# Patient Record
Sex: FEMALE | Race: WHITE | NOT HISPANIC OR LATINO | Employment: PART TIME | URBAN - NONMETROPOLITAN AREA
[De-identification: names, ages, dates, MRNs, and addresses within clinical notes are randomized per-mention and may not be internally consistent; named-entity substitution may affect disease eponyms.]

---

## 2017-01-18 ENCOUNTER — PRENATAL OFFICE VISIT (OUTPATIENT)
Dept: FAMILY MEDICINE | Facility: OTHER | Age: 37
End: 2017-01-18
Payer: COMMERCIAL

## 2017-01-18 VITALS
HEART RATE: 103 BPM | SYSTOLIC BLOOD PRESSURE: 104 MMHG | HEIGHT: 67 IN | WEIGHT: 140.1 LBS | BODY MASS INDEX: 21.99 KG/M2 | OXYGEN SATURATION: 100 % | TEMPERATURE: 98.6 F | DIASTOLIC BLOOD PRESSURE: 58 MMHG | RESPIRATION RATE: 14 BRPM

## 2017-01-18 DIAGNOSIS — Z34.82 ENCOUNTER FOR SUPERVISION OF OTHER NORMAL PREGNANCY IN SECOND TRIMESTER: Primary | ICD-10-CM

## 2017-01-18 PROCEDURE — 59425 ANTEPARTUM CARE ONLY: CPT | Performed by: FAMILY MEDICINE

## 2017-01-18 ASSESSMENT — PAIN SCALES - GENERAL: PAINLEVEL: NO PAIN (0)

## 2017-01-18 NOTE — NURSING NOTE
"Chief Complaint   Patient presents with     Prenatal Care     22w2d       Initial /58 mmHg  Pulse 103  Temp(Src) 98.6  F (37  C) (Tympanic)  Resp 14  Ht 5' 6.5\" (1.689 m)  Wt 140 lb 1.6 oz (63.549 kg)  BMI 22.28 kg/m2  SpO2 100%  LMP 08/05/2016 (Approximate) Estimated body mass index is 22.28 kg/(m^2) as calculated from the following:    Height as of this encounter: 5' 6.5\" (1.689 m).    Weight as of this encounter: 140 lb 1.6 oz (63.549 kg).  BP completed using cuff size: regular  Health Maintenance Due   Topic Date Due     TETANUS IMMUNIZATION (SYSTEM ASSIGNED)  07/06/2000     INFLUENZA VACCINE (SYSTEM ASSIGNED)  09/01/2016     MATERNAL SCREENING  11/28/2016     OBGCT (OB)  01/30/2017     Rachel Dejesus, [facility]      "

## 2017-01-18 NOTE — PROGRESS NOTES
22w2d  Combined Weight Gain:  17  Fetal Heart Rate:  140  Fundal Height:  22  Position:    Urine Lab Results:    Risk Factors Assessed:  YES. Date-  Patient Active Problem List   Diagnosis     Status post  delivery     Breast feeding status of mother     Rh negative status during pregnancy in first trimester     Encounter for supervision of other normal pregnancy in second trimester     Doing well.  No concerns today.  Prenatal flowsheet information is reviewed.  Discussed triple/quad screening.  She declines testing at this time.  Discussed kick counts and fetal movement.  Reportable signs and symptoms discussed..  Vaginal Discharge:  normal  Nausea and Vomiting: No   Cervical Dilation:  Deferred  Effacement:  Deferred  Edema:  None  Fetal Activity: Active and Discussed fetal kick counts   Labor Signs/Symptoms: None  Vitamins:  PNV  Next Appointment:  4 weeks

## 2017-01-19 ENCOUNTER — HOSPITAL ENCOUNTER (OUTPATIENT)
Dept: ULTRASOUND IMAGING | Facility: CLINIC | Age: 37
Discharge: HOME | End: 2017-01-19
Attending: FAMILY MEDICINE | Admitting: FAMILY MEDICINE
Payer: COMMERCIAL

## 2017-01-19 DIAGNOSIS — Z34.81 ENCOUNTER FOR SUPERVISION OF OTHER NORMAL PREGNANCY IN FIRST TRIMESTER: ICD-10-CM

## 2017-01-19 PROCEDURE — 76805 OB US >/= 14 WKS SNGL FETUS: CPT

## 2017-01-20 PROBLEM — O44.42 LOW-LYING PLACENTA IN SECOND TRIMESTER: Status: ACTIVE | Noted: 2017-01-20

## 2017-02-16 ENCOUNTER — PRENATAL OFFICE VISIT (OUTPATIENT)
Dept: FAMILY MEDICINE | Facility: OTHER | Age: 37
End: 2017-02-16
Payer: COMMERCIAL

## 2017-02-16 VITALS
TEMPERATURE: 97.4 F | WEIGHT: 141.5 LBS | SYSTOLIC BLOOD PRESSURE: 100 MMHG | DIASTOLIC BLOOD PRESSURE: 62 MMHG | HEART RATE: 100 BPM | BODY MASS INDEX: 22.5 KG/M2 | OXYGEN SATURATION: 100 % | RESPIRATION RATE: 12 BRPM

## 2017-02-16 DIAGNOSIS — Z34.02 ENCOUNTER FOR SUPERVISION OF NORMAL FIRST PREGNANCY IN SECOND TRIMESTER: ICD-10-CM

## 2017-02-16 DIAGNOSIS — Z34.82 ENCOUNTER FOR SUPERVISION OF OTHER NORMAL PREGNANCY IN SECOND TRIMESTER: Primary | ICD-10-CM

## 2017-02-16 LAB
GLUCOSE 1H P 50 G GLC PO SERPL-MCNC: 89 MG/DL (ref 60–129)
HGB BLD-MCNC: 13.8 G/DL (ref 11.7–15.7)

## 2017-02-16 PROCEDURE — 00000218 ZZHCL STATISTIC OBHBG - HEMOGLOBIN: Performed by: FAMILY MEDICINE

## 2017-02-16 PROCEDURE — 99213 OFFICE O/P EST LOW 20 MIN: CPT | Performed by: FAMILY MEDICINE

## 2017-02-16 PROCEDURE — 82950 GLUCOSE TEST: CPT | Performed by: FAMILY MEDICINE

## 2017-02-16 PROCEDURE — 36415 COLL VENOUS BLD VENIPUNCTURE: CPT | Performed by: FAMILY MEDICINE

## 2017-02-16 ASSESSMENT — PAIN SCALES - GENERAL: PAINLEVEL: NO PAIN (0)

## 2017-02-16 NOTE — MR AVS SNAPSHOT
After Visit Summary   2/16/2017    Kirti Berry    MRN: [id number]           Patient Information     Date Of Birth          1980        Visit Information        Provider Department      2/16/2017 8:40 AM Mickey Gann MD [facility]        Today's Diagnoses     Encounter for supervision of other normal pregnancy in second trimester    -  1    Encounter for supervision of normal first pregnancy in second trimester           Follow-ups after your visit        Who to contact     If you have questions or need follow up information about today's clinic visit or your schedule please contact [facility] directly at [phone].  Normal or non-critical lab and imaging results will be communicated to you by SoFihart, letter or phone within 4 business days after the clinic has received the results. If you do not hear from us within 7 days, please contact the clinic through Markafonit or phone. If you have a critical or abnormal lab result, we will notify you by phone as soon as possible.  Submit refill requests through TSSI Systems or call your pharmacy and they will forward the refill request to us. Please allow 3 business days for your refill to be completed.          Additional Information About Your Visit        MyChart Information     TSSI Systems gives you secure access to your electronic health record. If you see a primary care provider, you can also send messages to your care team and make appointments. If you have questions, please call your primary care clinic.  If you do not have a primary care provider, please call [phone] and they will assist you.        Care EveryWhere ID     This is your Care EveryWhere ID. This could be used by other organizations to access your [place] medical records  [id number]        Your Vitals Were     Pulse Temperature Respirations Last Period Pulse Oximetry BMI (Body Mass Index)    100 97.4  F (36.3  C) (Tympanic) 12 08/05/2016 (Approximate)  100% 22.5 kg/m2       Blood Pressure from Last 3 Encounters:   02/16/17 100/62   01/18/17 104/58   12/22/16 94/52    Weight from Last 3 Encounters:   02/16/17 141 lb 8 oz (64.2 kg)   01/18/17 140 lb 1.6 oz (63.5 kg)   12/22/16 132 lb (59.9 kg)              We Performed the Following     Glucose tolerance, gest screen, 1 hour     OB Hemoglobin     RH IMMUNE GLOBULIN        Primary Care Provider    None Specified       No primary provider on file.        Thank you!     Thank you for choosing [facility]  for your care. Our goal is always to provide you with excellent care. Hearing back from our patients is one way we can continue to improve our services. Please take a few minutes to complete the written survey that you may receive in the mail after your visit with us. Thank you!             Your Updated Medication List - Protect others around you: Learn how to safely use, store and throw away your medicines at www.disposemymeds.org.          This list is accurate as of: 2/16/17 10:43 AM.  Always use your most recent med list.                   Brand Name Dispense Instructions for use    prenatal multivitamin  plus iron 27-0.8 MG Tabs per tablet     100 tablet    Take 1 tablet by mouth daily       rho(D) immune globulin 300 MCG injection    HYPERRHO/RHOGAM    1 mcg    Inject 300 mcg into the muscle once

## 2017-02-16 NOTE — NURSING NOTE
"Chief Complaint   Patient presents with     Prenatal Care       Initial /62 (BP Location: Left arm, Patient Position: Chair, Cuff Size: Adult Regular)  Pulse 100  Temp 97.4  F (36.3  C) (Tympanic)  Resp 12  Wt 141 lb 8 oz (64.2 kg)  LMP 08/05/2016 (Approximate)  SpO2 100%  BMI 22.5 kg/m2 Estimated body mass index is 22.5 kg/(m^2) as calculated from the following:    Height as of 1/18/17: 5' 6.5\" (1.689 m).    Weight as of this encounter: 141 lb 8 oz (64.2 kg).  Medication Reconciliation: complete   Julia Peterson CMA (AAMA)      "

## 2017-02-16 NOTE — PROGRESS NOTES
26w3d  Combined Weight Gain:  18  Fetal Heart Rate:  140  Fundal Height:  26  Position:  Vertex  Urine Lab Results:    Risk Factors Assessed:  YES. Date-    Patient Active Problem List   Diagnosis     Status post  delivery     Breast feeding status of mother     Rh negative status during pregnancy in first trimester     Encounter for supervision of other normal pregnancy in second trimester     Low-lying placenta in second trimester     Doing well.  No concerns today.    Vaginal Discharge:  normal  Nausea and Vomiting: No   Cervical Dilation:  Deferred  Effacement:  Deferred  Edema:  None  Fetal Activity: Active and Discussed fetal kick counts   Labor Signs/Symptoms: None  Vitamins:  PNV  Next Appointment:  4 weeks      Kirti is here for her 26 week visit.  She is 26w3d weeks today.  Kirti understands the following:  Definition of  labor:   Yes  Warning signs of  labor:  Yes  Palpatation of uterine contractions:  Yes  Warning signs of pregnancy complications:  Yes  Weight gain:  Yes  Nutrition choices (review intake and risks):  Yes  Prevention/treatment of constipation (increase fluids/fiber):  Yes  Discontinue smoking, drug use, ETOH use:  Yes  Decrease strenuous activities (increase rest):  Yes  Consider work/activity/environmental hazards:  Yes  Travel recommendations:  Yes  Sexual activity/intercourse:  Yes  Avoid nipple stimulation:  Yes  Infant feeding plans:  Yes  Car seat:  Yes  Fetal kick counts discussed:  Yes  Other topics discussed:      Tubal Sterilization signed?  Unknown   scheduled:  No   MD Choice:    Electronically signed by Mickey Gann MD

## 2017-02-16 NOTE — PROGRESS NOTES
Prior to injection verified patient identity using patient's name and date of birth.    The following medication was given:     MEDICATION: RhoGam 300 ug  ROUTE: IM  SITE: RUQ - Gluteus  DOSE: 300 ug  LOT #: [id number]  :  Ruben  EXPIRATION DATE:  2017  NDC#: 9005-4709-00    Julia Peterson CMA (AAMA)

## 2017-03-22 ENCOUNTER — PRENATAL OFFICE VISIT (OUTPATIENT)
Dept: FAMILY MEDICINE | Facility: OTHER | Age: 37
End: 2017-03-22
Payer: MEDICAID

## 2017-03-22 VITALS
WEIGHT: 148 LBS | HEART RATE: 110 BPM | OXYGEN SATURATION: 100 % | RESPIRATION RATE: 20 BRPM | BODY MASS INDEX: 23.53 KG/M2 | TEMPERATURE: 98 F | DIASTOLIC BLOOD PRESSURE: 60 MMHG | SYSTOLIC BLOOD PRESSURE: 102 MMHG

## 2017-03-22 DIAGNOSIS — O44.42 LOW-LYING PLACENTA IN SECOND TRIMESTER: ICD-10-CM

## 2017-03-22 DIAGNOSIS — Z34.82 ENCOUNTER FOR SUPERVISION OF OTHER NORMAL PREGNANCY IN SECOND TRIMESTER: Primary | ICD-10-CM

## 2017-03-22 PROCEDURE — 99213 OFFICE O/P EST LOW 20 MIN: CPT | Performed by: FAMILY MEDICINE

## 2017-03-22 ASSESSMENT — PAIN SCALES - GENERAL: PAINLEVEL: NO PAIN (0)

## 2017-03-22 NOTE — PROGRESS NOTES
31w2d  Combined Weight Gain:  25  Fetal Heart Rate:  140  Fundal Height:  30  Position:  Vertex  Urine Lab Results:    Risk Factors Assessed:  YES. Date-  Patient Active Problem List   Diagnosis     Status post  delivery     Breast feeding status of mother     Rh negative status during pregnancy in first trimester     Encounter for supervision of other normal pregnancy in second trimester     Low-lying placenta in second trimester     Current Outpatient Prescriptions   Medication Sig Dispense Refill     rho,D, immune globulin (HYPERRHO/RHOGAM) 300 MCG injection Inject 300 mcg into the muscle once 1 mcg 0     Prenatal Vit-Fe Fumarate-FA (PRENATAL MULTIVITAMIN  PLUS IRON) 27-0.8 MG TABS Take 1 tablet by mouth daily 100 tablet 3     Results for orders placed or performed in visit on 17   Glucose tolerance, gest screen, 1 hour   Result Value Ref Range    Glu Gest Screen 1hr 50g 89 60 - 129 mg/dL   OB Hemoglobin   Result Value Ref Range    Hemoglobin 13.8 11.7 - 15.7 g/dL     .  Vaginal Discharge:  normal  Nausea and Vomiting: No   Cervical Dilation:  Deferred  Effacement:  Deferred  Edema:  None  Fetal Activity: Active and Discussed fetal kick counts   Labor Signs/Symptoms: None  Vitamins:  PNV  Next Appointment:  4 weeks. She will then see Dr. Eisenberg to schedule RCS at 39 weeks.      ICD-10-CM    1. Encounter for supervision of other normal pregnancy in second trimester Z34.82 US OB Ltd One Or More Fetus FU/Repeat   2. Low-lying placenta in second trimester O44.42 US OB Ltd One Or More Fetus FU/Repeat       Orders Placed This Encounter     US OB Ltd One Or More Fetus FU/Repeat       Electronically signed by Mickey Gann MD

## 2017-03-22 NOTE — MR AVS SNAPSHOT
After Visit Summary   3/22/2017    Kirti Berry    MRN: [id number]           Patient Information     Date Of Birth          1980        Visit Information        Provider Department      3/22/2017 8:40 AM Mickey Gann MD [facility]        Today's Diagnoses     Encounter for supervision of other normal pregnancy in second trimester    -  1    Low-lying placenta in second trimester           Follow-ups after your visit        Follow-up notes from your care team     Return in about 4 weeks (around 4/19/2017) for prenatal visit.      Future tests that were ordered for you today     Open Future Orders        Priority Expected Expires Ordered    US OB Ltd One Or More Fetus FU/Repeat Routine 6/20/2017 3/22/2018 3/22/2017            Who to contact     If you have questions or need follow up information about today's clinic visit or your schedule please contact [facility] directly at [phone].  Normal or non-critical lab and imaging results will be communicated to you by MyChart, letter or phone within 4 business days after the clinic has received the results. If you do not hear from us within 7 days, please contact the clinic through DeluxeBoxhart or phone. If you have a critical or abnormal lab result, we will notify you by phone as soon as possible.  Submit refill requests through IronPort Systems or call your pharmacy and they will forward the refill request to us. Please allow 3 business days for your refill to be completed.          Additional Information About Your Visit        MyChart Information     Iron[employer] Systems gives you secure access to your electronic health record. If you see a primary care provider, you can also send messages to your care team and make appointments. If you have questions, please call your primary care clinic.  If you do not have a primary care provider, please call [phone] and they will assist you.        Care EveryWhere ID     This is your Care EveryWhere  ID. This could be used by other organizations to access your [place] medical records  [id number]        Your Vitals Were     Pulse Temperature Respirations Last Period Pulse Oximetry BMI (Body Mass Index)    110 98  F (36.7  C) (Temporal) 20 08/05/2016 (Approximate) 100% 23.53 kg/m2       Blood Pressure from Last 3 Encounters:   03/22/17 102/60   02/16/17 100/62   01/18/17 104/58    Weight from Last 3 Encounters:   03/22/17 148 lb (67.1 kg)   02/16/17 141 lb 8 oz (64.2 kg)   01/18/17 140 lb 1.6 oz (63.5 kg)               Primary Care Provider    None Specified       No primary provider on file.        Thank you!     Thank you for choosing [facility]  for your care. Our goal is always to provide you with excellent care. Hearing back from our patients is one way we can continue to improve our services. Please take a few minutes to complete the written survey that you may receive in the mail after your visit with us. Thank you!             Your Updated Medication List - Protect others around you: Learn how to safely use, store and throw away your medicines at www.disposemymeds.org.          This list is accurate as of: 3/22/17  9:09 AM.  Always use your most recent med list.                   Brand Name Dispense Instructions for use    prenatal multivitamin  plus iron 27-0.8 MG Tabs per tablet     100 tablet    Take 1 tablet by mouth daily       rho(D) immune globulin 300 MCG injection    HYPERRHO/RHOGAM    1 mcg    Inject 300 mcg into the muscle once

## 2017-03-22 NOTE — NURSING NOTE
"Chief Complaint   Patient presents with     Prenatal Care     31 weeks 2 days       Initial /60 (BP Location: Left arm, Patient Position: Chair, Cuff Size: Adult Regular)  Pulse 110  Temp 98  F (36.7  C) (Temporal)  Resp 20  Wt 148 lb (67.1 kg)  LMP 08/05/2016 (Approximate)  SpO2 100%  BMI 23.53 kg/m2 Estimated body mass index is 23.53 kg/(m^2) as calculated from the following:    Height as of 1/18/17: 5' 6.5\" (1.689 m).    Weight as of this encounter: 148 lb (67.1 kg).  Medication Reconciliation: complete    "

## 2017-03-28 ENCOUNTER — HOSPITAL ENCOUNTER (OUTPATIENT)
Dept: ULTRASOUND IMAGING | Facility: CLINIC | Age: 37
Discharge: HOME | End: 2017-03-28
Attending: FAMILY MEDICINE | Admitting: FAMILY MEDICINE
Payer: MEDICAID

## 2017-03-28 DIAGNOSIS — Z34.82 ENCOUNTER FOR SUPERVISION OF OTHER NORMAL PREGNANCY IN SECOND TRIMESTER: ICD-10-CM

## 2017-03-28 DIAGNOSIS — O44.42 LOW-LYING PLACENTA IN SECOND TRIMESTER: ICD-10-CM

## 2017-03-28 PROCEDURE — 76816 OB US FOLLOW-UP PER FETUS: CPT

## 2017-04-20 ENCOUNTER — TELEPHONE (OUTPATIENT)
Dept: FAMILY MEDICINE | Facility: CLINIC | Age: 37
End: 2017-04-20

## 2017-04-20 ENCOUNTER — PRENATAL OFFICE VISIT (OUTPATIENT)
Dept: FAMILY MEDICINE | Facility: OTHER | Age: 37
End: 2017-04-20
Payer: COMMERCIAL

## 2017-04-20 VITALS
DIASTOLIC BLOOD PRESSURE: 60 MMHG | SYSTOLIC BLOOD PRESSURE: 120 MMHG | OXYGEN SATURATION: 100 % | RESPIRATION RATE: 20 BRPM | TEMPERATURE: 97.2 F | BODY MASS INDEX: 24.47 KG/M2 | HEART RATE: 89 BPM | WEIGHT: 153.9 LBS

## 2017-04-20 DIAGNOSIS — O44.42 LOW-LYING PLACENTA IN SECOND TRIMESTER: ICD-10-CM

## 2017-04-20 DIAGNOSIS — Z98.891 STATUS POST CESAREAN DELIVERY: ICD-10-CM

## 2017-04-20 DIAGNOSIS — O26.891 RH NEGATIVE STATUS DURING PREGNANCY IN FIRST TRIMESTER: ICD-10-CM

## 2017-04-20 DIAGNOSIS — Z34.82 ENCOUNTER FOR SUPERVISION OF OTHER NORMAL PREGNANCY IN SECOND TRIMESTER: Primary | ICD-10-CM

## 2017-04-20 DIAGNOSIS — Z67.91 RH NEGATIVE STATUS DURING PREGNANCY IN FIRST TRIMESTER: ICD-10-CM

## 2017-04-20 PROCEDURE — 99213 OFFICE O/P EST LOW 20 MIN: CPT | Performed by: FAMILY MEDICINE

## 2017-04-20 ASSESSMENT — PAIN SCALES - GENERAL: PAINLEVEL: NO PAIN (0)

## 2017-04-20 NOTE — Clinical Note
Please call patient to schedule consult with [place] next week for RCS at 39 weeks. Electronically signed by Mickey Gann MD

## 2017-04-20 NOTE — NURSING NOTE
"Chief Complaint   Patient presents with     Prenatal Care     35w3d       Initial /60  Pulse 89  Temp 97.2  F (36.2  C) (Tympanic)  Resp 20  Wt 153 lb 14.4 oz (69.8 kg)  LMP 08/05/2016 (Approximate)  SpO2 100%  BMI 24.47 kg/m2 Estimated body mass index is 24.47 kg/(m^2) as calculated from the following:    Height as of 1/18/17: 5' 6.5\" (1.689 m).    Weight as of this encounter: 153 lb 14.4 oz (69.8 kg).  Medication Reconciliation: complete    "

## 2017-04-20 NOTE — MR AVS SNAPSHOT
After Visit Summary   2017    Kirti Berry    MRN: [id number]           Patient Information     Date Of Birth          1980        Visit Information        Provider Department      2017 9:20 AM Mickey Gann MD [facility]        Today's Diagnoses     Encounter for supervision of other normal pregnancy in second trimester    -  1    Low-lying placenta in second trimester        Rh negative status during pregnancy in first trimester        Status post  delivery           Follow-ups after your visit        Your next 10 appointments already scheduled     May 08, 2017  8:40 AM CDT   ESTABLISHED PRENATAL with Mickey Gann MD   [facility]    [address] [facility] [id number] [phone]              Who to contact     If you have questions or need follow up information about today's clinic visit or your schedule please contact [facility] directly at [phone].  Normal or non-critical lab and imaging results will be communicated to you by MyChart, letter or phone within 4 business days after the clinic has received the results. If you do not hear from us within 7 days, please contact the clinic through O Entregadorhart or phone. If you have a critical or abnormal lab result, we will notify you by phone as soon as possible.  Submit refill requests through FamilySkyline or call your pharmacy and they will forward the refill request to us. Please allow 3 business days for your refill to be completed.          Additional Information About Your Visit        MyChart Information     FamilySkyline gives you secure access to your electronic health record. If you see a primary care provider, you can also send messages to your care team and make appointments. If you have questions, please call your primary care clinic.  If you do not have a primary care provider, please call [phone] and they will assist you.        Care EveryWhere  ID     This is your Care EveryWhere ID. This could be used by other organizations to access your [place] medical records  [id number]        Your Vitals Were     Pulse Temperature Respirations Last Period Pulse Oximetry BMI (Body Mass Index)    89 97.2  F (36.2  C) (Tympanic) 20 08/05/2016 (Approximate) 100% 24.47 kg/m2       Blood Pressure from Last 3 Encounters:   04/20/17 120/60   03/22/17 102/60   02/16/17 100/62    Weight from Last 3 Encounters:   04/20/17 153 lb 14.4 oz (69.8 kg)   03/22/17 148 lb (67.1 kg)   02/16/17 141 lb 8 oz (64.2 kg)              Today, you had the following     No orders found for display       Primary Care Provider    None Specified       No primary provider on file.        Thank you!     Thank you for choosing [facility]  for your care. Our goal is always to provide you with excellent care. Hearing back from our patients is one way we can continue to improve our services. Please take a few minutes to complete the written survey that you may receive in the mail after your visit with us. Thank you!             Your Updated Medication List - Protect others around you: Learn how to safely use, store and throw away your medicines at www.disposemymeds.org.          This list is accurate as of: 4/20/17  9:51 AM.  Always use your most recent med list.                   Brand Name Dispense Instructions for use    prenatal multivitamin  plus iron 27-0.8 MG Tabs per tablet     100 tablet    Take 1 tablet by mouth daily       rho(D) immune globulin 300 MCG injection    HYPERRHO/RHOGAM    1 mcg    Inject 300 mcg into the muscle once

## 2017-04-20 NOTE — PROGRESS NOTES
35w3d  Combined Weight Gain:  30  Fetal Heart Rate:  140  Fundal Height:  35  Position:  Vertex  Urine Lab Results:    Risk Factors Assessed:  YES. Date-  Patient Active Problem List   Diagnosis     Status post  delivery     Breast feeding status of mother     Rh negative status during pregnancy in first trimester     Encounter for supervision of other normal pregnancy in second trimester     Low-lying placenta in second trimester     Doing well.  No concerns today.  Prenatal flowsheet information is reviewed.  Discussed signs of labor and when to call or come in.  C-sections discussed with the patient.  Risks include but are not limited to bleeding, infection, emergent hysterectomy, injury to bowel and bladder and other maternal organs as well as risk of injury to the fetus.  Will follow up with Dr. Eisenberg in 1 week and follow up with me at 37 and 38 weeks.  Discussed kick counts and fetal movement.  Reportable signs and symptoms discussed..  Vaginal Discharge:  normal  Nausea and Vomiting: No   Cervical Dilation:  Deferred  Effacement:  Deferred  Edema:  None  Fetal Activity: Active and Discussed fetal kick counts   Labor Signs/Symptoms: None  Vitamins:  PNV  Next Appointment:  1 week with Dr. Eisenberg as consult for RCS at 39 weeks.

## 2017-04-25 ENCOUNTER — PRENATAL OFFICE VISIT (OUTPATIENT)
Dept: FAMILY MEDICINE | Facility: CLINIC | Age: 37
End: 2017-04-25
Payer: COMMERCIAL

## 2017-04-25 VITALS
DIASTOLIC BLOOD PRESSURE: 50 MMHG | SYSTOLIC BLOOD PRESSURE: 102 MMHG | HEART RATE: 99 BPM | OXYGEN SATURATION: 99 % | TEMPERATURE: 98.4 F | RESPIRATION RATE: 20 BRPM | BODY MASS INDEX: 24.87 KG/M2 | WEIGHT: 156.4 LBS

## 2017-04-25 DIAGNOSIS — O34.219 PREVIOUS CESAREAN DELIVERY, ANTEPARTUM CONDITION OR COMPLICATION: ICD-10-CM

## 2017-04-25 DIAGNOSIS — O09.93 SUPERVISION OF HIGH-RISK PREGNANCY, THIRD TRIMESTER: ICD-10-CM

## 2017-04-25 PROBLEM — O09.90 SUPERVISION OF HIGH-RISK PREGNANCY: Status: ACTIVE | Noted: 2017-04-25

## 2017-04-25 PROBLEM — O44.42 LOW-LYING PLACENTA IN SECOND TRIMESTER: Status: RESOLVED | Noted: 2017-01-20 | Resolved: 2017-04-25

## 2017-04-25 LAB — HGB BLD-MCNC: 12 G/DL (ref 11.7–15.7)

## 2017-04-25 PROCEDURE — 86780 TREPONEMA PALLIDUM: CPT | Performed by: FAMILY MEDICINE

## 2017-04-25 PROCEDURE — 36415 COLL VENOUS BLD VENIPUNCTURE: CPT | Performed by: FAMILY MEDICINE

## 2017-04-25 PROCEDURE — 00000218 ZZHCL STATISTIC OBHBG - HEMOGLOBIN: Performed by: FAMILY MEDICINE

## 2017-04-25 PROCEDURE — 99214 OFFICE O/P EST MOD 30 MIN: CPT | Performed by: FAMILY MEDICINE

## 2017-04-25 ASSESSMENT — PAIN SCALES - GENERAL: PAINLEVEL: NO PAIN (0)

## 2017-04-25 NOTE — MR AVS SNAPSHOT
After Visit Summary   2017    Kirti Berry    MRN: [id number]           Patient Information     Date Of Birth          1980        Visit Information        Provider Department      2017 1:00 PM Ward Eisenberg MD [facility]        Today's Diagnoses     Consult for RLTCS    -  1    Supervision of high-risk pregnancy, third trimester        Previous  delivery, antepartum condition or complication           Follow-ups after your visit        Your next 10 appointments already scheduled     May 08, 2017  8:40 AM CDT   [facility]    [address]  [facility] [address]   [phone]            May 16, 2017   Procedure with Ward Eisenberg MD   [facility] ([facility]    [address] [place] [id number] [phone]           From [address]: Exit at [address] on south side of [place]. Turn right on [address]. Turn left at stoplight on [address]. [facility] will be in view two blocks ahead              Future tests that were ordered for you today     Open Future Orders        Priority Expected Expires Ordered    ABO/Rh type and screen Routine 2017    OB hemoglobin Routine 2017            Who to contact     If you have questions or need follow up information about today's clinic visit or your schedule please contact [facility] directly at [phone].  Normal or non-critical lab and imaging results will be communicated to you by MyChart, letter or phone within 4 business days after the clinic has received the results. If you do not hear from us within 7 days, please contact the clinic through MyChart or phone. If you have a critical or abnormal lab result, we will notify you by phone as soon as possible.  Submit refill requests through  Magisto or call your pharmacy and they will forward the refill request to us. Please allow 3 business days for your refill to be completed.          Additional Information About Your Visit        Finderlyhart Information     Magisto gives you secure access to your electronic health record. If you see a primary care provider, you can also send messages to your care team and make appointments. If you have questions, please call your primary care clinic.  If you do not have a primary care provider, please call [phone] and they will assist you.        Care EveryWhere ID     This is your Care EveryWhere ID. This could be used by other organizations to access your [place] medical records  [id number]        Your Vitals Were     Pulse Temperature Respirations Last Period Pulse Oximetry Breastfeeding?    99 98.4  F (36.9  C) (Temporal) 20 08/05/2016 (Approximate) 99% Unknown    BMI (Body Mass Index)                   24.87 kg/m2            Blood Pressure from Last 3 Encounters:   04/25/17 102/50   04/20/17 120/60   03/22/17 102/60    Weight from Last 3 Encounters:   04/25/17 70.9 kg (156 lb 6.4 oz)   04/20/17 69.8 kg (153 lb 14.4 oz)   03/22/17 67.1 kg (148 lb)              We Performed the Following     Anti Treponema     OB hemoglobin        Primary Care Provider    None Specified       No primary provider on file.        Thank you!     Thank you for choosing [facility]  for your care. Our goal is always to provide you with excellent care. Hearing back from our patients is one way we can continue to improve our services. Please take a few minutes to complete the written survey that you may receive in the mail after your visit with us. Thank you!             Your Updated Medication List - Protect others around you: Learn how to safely use, store and throw away your medicines at www.disposemymeds.org.          This list is accurate as of: 4/25/17 11:59 PM.  Always use your most recent med list.                    Brand Name Dispense Instructions for use    prenatal multivitamin  plus iron 27-0.8 MG Tabs per tablet     100 tablet    Take 1 tablet by mouth daily       rho(D) immune globulin 300 MCG injection    HYPERRHO/RHOGAM    1 mcg    Inject 300 mcg into the muscle once

## 2017-04-25 NOTE — PROGRESS NOTES
Kirti Berry is in today for a consultation for a repeat .  She is a 36 year old female who is a , and is 36 1/7 wks gestation.  The patient was referred to me by Dr. Gann.    MEDICAL HISTORY  Past Medical History:   Diagnosis Date     Low-lying placenta in second trimester (resolved) 2017     NO ACTIVE PROBLEMS (aka NONE)      Supervision of high-risk pregnancy 2017       SURGICAL HISTORY   Past Surgical History:   Procedure Laterality Date      SECTION N/A 2015    Procedure:  SECTION;  Surgeon: Ward Eisenberg MD;  Location:  [facility]       ALLERGIES   No Known Allergies    CURRENT MEDICATIONS    Current Outpatient Prescriptions:      Prenatal Vit-Fe Fumarate-FA (PRENATAL MULTIVITAMIN  PLUS IRON) 27-0.8 MG TABS, Take 1 tablet by mouth daily, Disp: 100 tablet, Rfl: 3     rho,D, immune globulin (HYPERRHO/RHOGAM) 300 MCG injection, Inject 300 mcg into the muscle once, Disp: 1 mcg, Rfl: 0    SOCIAL HISTORY  Social History     Social History     Marital status: Single     Spouse name: N/A     Number of children: N/A     Years of education: N/A     Occupational History     Not on file.     Social History Main Topics     Smoking status: Never Smoker     Smokeless tobacco: Never Used     Alcohol use No     Drug use: No     Sexual activity: Yes     Partners: Male     Other Topics Concern      Service No     Blood Transfusions No     Caffeine Concern No     Occupational Exposure Yes          Hobby Hazards No     Sleep Concern No     Stress Concern No     Weight Concern No     Special Diet No     Back Care No     Exercise No     Seat Belt Yes     Social History Narrative    Lives in [place] with her son, Richard.  She's in a relationship with Chandu.  No smokers in the home.  She has an indoor cat.  Discussed toxoplasmosis precautions.  No concerns about domestic violence.       FAMILY HISTORY  Family History   Problem Relation Age of Onset      CEREBROVASCULAR DISEASE Father      Alcohol/Drug Father      DIABETES Maternal Grandmother      Alzheimer Disease Maternal Grandmother      Obesity Maternal Grandfather      Alzheimer Disease Paternal Grandmother      Arthritis Paternal Grandmother      Family History Negative Paternal Grandfather        IMMUNIZATIONS  Immunization History   Administered Date(s) Administered     Rhogam 2014, 2017       We discussed the potential complications of a repeat  delivery.  We reviewed the entire consent form and the risks involved.  In general with a repeat , the risks are similar to that of a primary but blood loss can be more or less depending on the amount of scarring from her previous surgery. This is also true for potential injury to bowel bladder and other internal organ, depending on the amount of scarring she has from her previous surgery.     The other possible complications arising from repeat  include but are not limited to pain, bleeding, infection, injury to the fetus, injury to the maternal bowel, bladder or other adjacent organs, possible nerve damage or temporary loss of limb function or even temporary paralysis, blood clots in the legs or pelvis or the ultimate risk would be loss of life.  With bleeding, if there is excessive blood loss, there could be the need for transfusion or even the need to do a  hysterectomy.    Regarding blood loss, there can be anywhere from 300 to 3000cc with the average being about 1000cc during a repeat  delivery.  If there is excessive blood loss or hemorrhage with a postpartum bleed or injury to uterine vessels during a  delivery, there may be need for blood transfusion and the inherent risks that come along with that including risk of HIV and hepatitis.  The patient had no further questions and had all of them answered to her satisfaction.    Objective:  /50 (BP Location: Left arm, Patient Position: Chair,  Cuff Size: Adult Regular)  Pulse 99  Temp 98.4  F (36.9  C) (Temporal)  Resp 20  Wt 156 lb 6.4 oz (70.9 kg)  LMP 2016 (Approximate)  SpO2 99%  Breastfeeding? Unknown  BMI 24.87 kg/m2    Exam:    Abdomen: gravid, measuring 35.5 cm with FHR in the 140s, old scar is well healed    Assesment :  Previous Uterine Scar    Plan:  Repeat Low Transverse Uterine  Section  Scheduled for 17 at 11:30.  The patient is aware that if she should go into active labor prior to the date of her scheduled section and she is beyond 36 wks gestation, that she should present to L&D and we will be notified and her delivery will be done at that time.  She will follow up with her primary physician for the remainder of her prenatal care and will have her preop physical within 7 days of the date of surgery.    30 minutes were spent with this patient during this consultation with over 50% spent in counseling regarding RLTCS.    cc: Mickey Gann M.D.     Electronically signed by:  Ward Eisenberg M.D.  2017

## 2017-04-25 NOTE — NURSING NOTE
"Chief Complaint   Patient presents with     Consult     ob- 36w1d- . Dr. Gann       Initial /50 (BP Location: Left arm, Patient Position: Chair, Cuff Size: Adult Regular)  Pulse 99  Temp 98.4  F (36.9  C) (Temporal)  Resp 20  Wt 156 lb 6.4 oz (70.9 kg)  LMP 2016 (Approximate)  SpO2 99%  BMI 24.87 kg/m2 Estimated body mass index is 24.87 kg/(m^2) as calculated from the following:    Height as of 17: 5' 6.5\" (1.689 m).    Weight as of this encounter: 156 lb 6.4 oz (70.9 kg).  Medication Reconciliation: complete   Caron/COLLETTE     "

## 2017-04-26 LAB — T PALLIDUM IGG+IGM SER QL: NEGATIVE

## 2017-04-26 RX ORDER — CEFAZOLIN SODIUM 2 G/100ML
2 INJECTION, SOLUTION INTRAVENOUS
Status: CANCELLED | OUTPATIENT
Start: 2017-04-26

## 2017-04-26 RX ORDER — SODIUM CHLORIDE, SODIUM LACTATE, POTASSIUM CHLORIDE, CALCIUM CHLORIDE 600; 310; 30; 20 MG/100ML; MG/100ML; MG/100ML; MG/100ML
INJECTION, SOLUTION INTRAVENOUS CONTINUOUS
Status: CANCELLED | OUTPATIENT
Start: 2017-04-26

## 2017-05-08 ENCOUNTER — PRENATAL OFFICE VISIT (OUTPATIENT)
Dept: FAMILY MEDICINE | Facility: OTHER | Age: 37
End: 2017-05-08
Payer: COMMERCIAL

## 2017-05-08 VITALS
DIASTOLIC BLOOD PRESSURE: 70 MMHG | TEMPERATURE: 97.5 F | RESPIRATION RATE: 18 BRPM | OXYGEN SATURATION: 100 % | HEART RATE: 84 BPM | BODY MASS INDEX: 25.1 KG/M2 | SYSTOLIC BLOOD PRESSURE: 112 MMHG | WEIGHT: 157.9 LBS

## 2017-05-08 DIAGNOSIS — Z01.818 PREOP GENERAL PHYSICAL EXAM: Primary | ICD-10-CM

## 2017-05-08 DIAGNOSIS — O34.219 PREVIOUS CESAREAN DELIVERY, ANTEPARTUM CONDITION OR COMPLICATION: ICD-10-CM

## 2017-05-08 DIAGNOSIS — Z67.91 RH NEGATIVE STATUS DURING PREGNANCY IN FIRST TRIMESTER: ICD-10-CM

## 2017-05-08 DIAGNOSIS — Z34.82 ENCOUNTER FOR SUPERVISION OF OTHER NORMAL PREGNANCY IN SECOND TRIMESTER: ICD-10-CM

## 2017-05-08 DIAGNOSIS — O26.891 RH NEGATIVE STATUS DURING PREGNANCY IN FIRST TRIMESTER: ICD-10-CM

## 2017-05-08 PROCEDURE — 99214 OFFICE O/P EST MOD 30 MIN: CPT | Performed by: FAMILY MEDICINE

## 2017-05-08 ASSESSMENT — PAIN SCALES - GENERAL: PAINLEVEL: NO PAIN (0)

## 2017-05-08 NOTE — PROGRESS NOTES
38w0d  Combined Weight Gain:  34  Fetal Heart Rate:  140  Fundal Height:  38  Position:  Vertex  Urine Lab Results:    Risk Factors Assessed:  YES. Date-  Patient Active Problem List   Diagnosis     Status post  delivery     Breast feeding status of mother     Rh negative status during pregnancy in first trimester     Encounter for supervision of other normal pregnancy in second trimester     Supervision of high-risk pregnancy     Previous  delivery, antepartum condition or complication     Current Outpatient Prescriptions   Medication Sig Dispense Refill     rho,D, immune globulin (HYPERRHO/RHOGAM) 300 MCG injection Inject 300 mcg into the muscle once 1 mcg 0     Prenatal Vit-Fe Fumarate-FA (PRENATAL MULTIVITAMIN  PLUS IRON) 27-0.8 MG TABS Take 1 tablet by mouth daily 100 tablet 3     Doing well.  No concerns today.  Prenatal flowsheet information is reviewed.  Discussed signs of labor and when to call or come in.  C-sections discussed with the patient.  Risks include but are not limited to bleeding, infection, emergent hysterectomy, injury to bowel and bladder and other maternal organs as well as risk of injury to the fetus.  Discussed kick counts and fetal movement.  Reportable signs and symptoms discussed..  Vaginal Discharge:  normal  Nausea and Vomiting: No   Cervical Dilation:  Deferred  Effacement:  Deferred  Edema:  None  Fetal Activity: Active and Discussed fetal kick counts   Labor Signs/Symptoms: None  Vitamins:  PNV  Next Appointment:  1 week for RCS    Electronically signed by Mickey Gann MD

## 2017-05-08 NOTE — PROGRESS NOTES
[facility]  [address] [facility] [address]  [phone]  Dept: [phone]    PRE-OP EVALUATION:  Today's date: 2017    Kirti Berry (: 1980) presents for pre-operative evaluation assessment as requested by Dr. Gann.  She requires evaluation and anesthesia risk assessment prior to undergoing surgery/procedure for treatment of C- Section .  Proposed procedure: C- Section    Date of Surgery/ Procedure: 2017  Time of Surgery/ Procedure: 11:30am  Hospital/Surgical Facility: [facility]    Primary Physician: No primary care provider on file.  Type of Anesthesia Anticipated: Spinal, epidural    Patient has a Health Care Directive or Living Will:  NO    1. NO - Do you have a history of heart attack, stroke, stent, bypass or surgery on an artery in the head, neck, heart or legs?  2. NO - Do you ever have any pain or discomfort in your chest?  3. NO - Do you have a history of  Heart Failure?  4. NO - Are you troubled by shortness of breath when: walking on the level, up a slight hill or at night?  5. NO - Do you currently have a cold, bronchitis or other respiratory infection?  6. NO - Do you have a cough, shortness of breath or wheezing?  7. NO - Do you sometimes get pains in the calves of your legs when you walk?  8. NO - Do you or anyone in your family have previous history of blood clots?  9. NO - Do you or does anyone in your family have a serious bleeding problem such as prolonged bleeding following surgeries or cuts?  10. YES - HAVE YOU EVER HAD PROBLEMS WITH ANEMIA OR BEEN TOLD TO TAKE IRON PILLS? Long time ago  11. NO - Have you had any abnormal blood loss such as black, tarry or bloody stools, or abnormal vaginal bleeding?  12. NO - Have you ever had a blood transfusion?  13. NO - Have you or any of your relatives ever had problems with anesthesia?  14. NO - Do you have sleep apnea, excessive snoring or daytime drowsiness?  15. NO - Do you have any prosthetic heart  valves?  16. NO - Do you have prosthetic joints?  17. YES - IS THERE ANY CHANCE THAT YOU MAY BE PREGNANT? Yes      HPI:                                                      Brief HPI related to upcoming procedure: Kirti Berry is a 36 year old female   at 38 weeks who presents with preop for RCS.        See problem list for active medical problems.  Problems all longstanding and stable, except as noted/documented.  See ROS for pertinent symptoms related to these conditions.                                                                                                  .    MEDICAL HISTORY:                                                      Patient Active Problem List    Diagnosis Date Noted     Supervision of high-risk pregnancy 2017     Priority: Medium     Previous  delivery, antepartum condition or complication 2017     Priority: Medium     Encounter for supervision of other normal pregnancy in second trimester 2016     Priority: Medium     Rh negative status during pregnancy in first trimester 2016     Priority: Medium     Breast feeding status of mother 2015     Priority: Medium     Status post  delivery 2015     Priority: Medium     Diagnosis updated by automated process. Provider to review and confirm.        Past Medical History:   Diagnosis Date     Low-lying placenta in second trimester (resolved) 2017     NO ACTIVE PROBLEMS (aka NONE)      Supervision of high-risk pregnancy 2017     Past Surgical History:   Procedure Laterality Date      SECTION N/A 2015    Procedure:  SECTION;  Surgeon: Ward Eisenberg MD;  Location:  L+D     Current Outpatient Prescriptions   Medication Sig Dispense Refill     rho,D, immune globulin (HYPERRHO/RHOGAM) 300 MCG injection Inject 300 mcg into the muscle once 1 mcg 0     Prenatal Vit-Fe Fumarate-FA (PRENATAL MULTIVITAMIN  PLUS IRON) 27-0.8 MG TABS Take 1 tablet by mouth daily  100 tablet 3     OTC products: no recent use of OTC ASA, NSAIDS or Steroids    No Known Allergies   Latex Allergy: NO    Social History   Substance Use Topics     Smoking status: Never Smoker     Smokeless tobacco: Never Used     Alcohol use No     History   Drug Use No       REVIEW OF SYSTEMS:                                                    C: NEGATIVE for fever, chills, change in weight  E/M: NEGATIVE for ear, mouth and throat problems  R: NEGATIVE for significant cough or SOB  CV: NEGATIVE for chest pain, palpitations or peripheral edema    EXAM:                                                    /70 (BP Location: Right arm, Patient Position: Chair, Cuff Size: Adult Regular)  Pulse 84  Temp 97.5  F (36.4  C) (Tympanic)  Resp 18  Wt 157 lb 14.4 oz (71.6 kg)  LMP 2016 (Approximate)  SpO2 100%  BMI 25.1 kg/m2  GENERAL APPEARANCE: healthy, alert and no distress  HENT: ear canals and TM's normal and nose and mouth without ulcers or lesions  RESP: lungs clear to auscultation - no rales, rhonchi or wheezes  CV: regular rate and rhythm, normal S1 S2, no S3 or S4 and no murmur, click or rub   ABDOMEN: soft, nontender, no HSM or masses and bowel sounds normal  NEURO: Normal strength and tone, sensory exam grossly normal, mentation intact and speech normal    DIAGNOSTICS:                                                    EKG: Not indicated due to non-vascular surgery and low risk of event (age <65 and without cardiac risk factors)    Recent Labs   Lab Test  17   1340  17   0924  16   1148   02/12/15   0905   HGB  12.0  13.8  12.9   < >  12.8   PLT   --    --   364   --   222    < > = values in this interval not displayed.        IMPRESSION:                                                    Reason for surgery/procedure:  at 38 weeks with prior CS due to failure to progress. RCS    The proposed surgical procedure is considered INTERMEDIATE risk.    REVISED CARDIAC RISK  INDEX  The patient has the following serious cardiovascular risks for perioperative complications such as (MI, PE, VFib and 3  AV Block):  No serious cardiac risks  INTERPRETATION: 0 risks: Class I (very low risk - 0.4% complication rate)    The patient has the following additional risks for perioperative complications:  No identified additional risks      ICD-10-CM    1. Preop general physical exam Z01.818    2. Encounter for supervision of other normal pregnancy in second trimester Z34.82    3. Rh negative status during pregnancy in first trimester O09.891    4. Previous  delivery, antepartum condition or complication O34.219        RECOMMENDATIONS:                                                        --Patient is to take all scheduled medications on the day of surgery EXCEPT for modifications listed below.    APPROVAL GIVEN to proceed with proposed procedure, without further diagnostic evaluation       Signed Electronically by: Mickey Gann MD    Copy of this evaluation report is provided to requesting physician.    [facility] Preop Guidelines

## 2017-05-08 NOTE — NURSING NOTE
"Chief Complaint   Patient presents with     Prenatal Care     38 weeks       Initial /70 (BP Location: Right arm, Patient Position: Chair, Cuff Size: Adult Regular)  Pulse 84  Temp 97.5  F (36.4  C) (Tympanic)  Resp 18  Wt 157 lb 14.4 oz (71.6 kg)  SpO2 100%  BMI 25.1 kg/m2 Estimated body mass index is 25.1 kg/(m^2) as calculated from the following:    Height as of 1/18/17: 5' 6.5\" (1.689 m).    Weight as of this encounter: 157 lb 14.4 oz (71.6 kg).  Medication Reconciliation: complete     Iona Encinas MA 5/8/2017        "

## 2017-05-15 ENCOUNTER — ANESTHESIA EVENT (OUTPATIENT)
Dept: OBGYN | Facility: CLINIC | Age: 37
End: 2017-05-15
Payer: COMMERCIAL

## 2017-05-15 DIAGNOSIS — O34.219 PREVIOUS CESAREAN DELIVERY, ANTEPARTUM CONDITION OR COMPLICATION: ICD-10-CM

## 2017-05-15 LAB
ABO + RH BLD: ABNORMAL
ABO + RH BLD: ABNORMAL
BLD GP AB INVEST PLASRBC-IMP: ABNORMAL
BLD GP AB SCN SERPL QL: ABNORMAL
BLOOD BANK CMNT PATIENT-IMP: ABNORMAL
BLOOD BANK CMNT PATIENT-IMP: ABNORMAL
HGB BLD-MCNC: 12.5 G/DL (ref 11.7–15.7)
SPECIMEN EXP DATE BLD: ABNORMAL

## 2017-05-15 PROCEDURE — 86850 RBC ANTIBODY SCREEN: CPT | Performed by: FAMILY MEDICINE

## 2017-05-15 PROCEDURE — 86870 RBC ANTIBODY IDENTIFICATION: CPT | Performed by: FAMILY MEDICINE

## 2017-05-15 PROCEDURE — 36415 COLL VENOUS BLD VENIPUNCTURE: CPT | Performed by: FAMILY MEDICINE

## 2017-05-15 PROCEDURE — 86900 BLOOD TYPING SEROLOGIC ABO: CPT | Performed by: FAMILY MEDICINE

## 2017-05-15 PROCEDURE — 86901 BLOOD TYPING SEROLOGIC RH(D): CPT | Performed by: FAMILY MEDICINE

## 2017-05-16 ENCOUNTER — SURGERY (OUTPATIENT)
Age: 37
End: 2017-05-16

## 2017-05-16 ENCOUNTER — HOSPITAL ENCOUNTER (INPATIENT)
Facility: CLINIC | Age: 37
LOS: 2 days | Discharge: HOME | End: 2017-05-18
Attending: FAMILY MEDICINE | Admitting: FAMILY MEDICINE
Payer: COMMERCIAL

## 2017-05-16 ENCOUNTER — ANESTHESIA (OUTPATIENT)
Dept: OBGYN | Facility: CLINIC | Age: 37
End: 2017-05-16
Payer: COMMERCIAL

## 2017-05-16 DIAGNOSIS — Z98.891 STATUS POST CESAREAN DELIVERY: Primary | ICD-10-CM

## 2017-05-16 LAB — BLOOD BANK CMNT PATIENT-IMP: NORMAL

## 2017-05-16 PROCEDURE — 25000125 ZZHC RX 250: Performed by: NURSE ANESTHETIST, CERTIFIED REGISTERED

## 2017-05-16 PROCEDURE — 25000125 ZZHC RX 250: Performed by: FAMILY MEDICINE

## 2017-05-16 PROCEDURE — 37000009 ZZH ANESTHESIA TECHNICAL FEE, EACH ADDTL 15 MIN: Performed by: FAMILY MEDICINE

## 2017-05-16 PROCEDURE — 12000031 ZZH R&B OB CRITICAL

## 2017-05-16 PROCEDURE — 25000132 ZZH RX MED GY IP 250 OP 250 PS 637: Performed by: FAMILY MEDICINE

## 2017-05-16 PROCEDURE — S0020 INJECTION, BUPIVICAINE HYDRO: HCPCS | Performed by: FAMILY MEDICINE

## 2017-05-16 PROCEDURE — 36000056 ZZH SURGERY LEVEL 3 1ST 30 MIN: Performed by: FAMILY MEDICINE

## 2017-05-16 PROCEDURE — 27210794 ZZH OR GENERAL SUPPLY STERILE: Performed by: FAMILY MEDICINE

## 2017-05-16 PROCEDURE — 59515 CESAREAN DELIVERY: CPT | Performed by: FAMILY MEDICINE

## 2017-05-16 PROCEDURE — 27110038 ZZH RX 271: Performed by: FAMILY MEDICINE

## 2017-05-16 PROCEDURE — 25000128 H RX IP 250 OP 636: Performed by: NURSE ANESTHETIST, CERTIFIED REGISTERED

## 2017-05-16 PROCEDURE — 71000015 ZZH RECOVERY PHASE 1 LEVEL 2 EA ADDTL HR: Performed by: FAMILY MEDICINE

## 2017-05-16 PROCEDURE — 27110028 ZZH OR GENERAL SUPPLY NON-STERILE: Performed by: FAMILY MEDICINE

## 2017-05-16 PROCEDURE — 25000128 H RX IP 250 OP 636: Performed by: FAMILY MEDICINE

## 2017-05-16 PROCEDURE — 36000058 ZZH SURGERY LEVEL 3 EA 15 ADDTL MIN: Performed by: FAMILY MEDICINE

## 2017-05-16 PROCEDURE — 71000014 ZZH RECOVERY PHASE 1 LEVEL 2 FIRST HR: Performed by: FAMILY MEDICINE

## 2017-05-16 PROCEDURE — 59514 CESAREAN DELIVERY ONLY: CPT | Mod: 80 | Performed by: FAMILY MEDICINE

## 2017-05-16 PROCEDURE — 37000008 ZZH ANESTHESIA TECHNICAL FEE, 1ST 30 MIN: Performed by: FAMILY MEDICINE

## 2017-05-16 RX ORDER — NALOXONE HYDROCHLORIDE 0.4 MG/ML
.1-.4 INJECTION, SOLUTION INTRAMUSCULAR; INTRAVENOUS; SUBCUTANEOUS
Status: DISCONTINUED | OUTPATIENT
Start: 2017-05-16 | End: 2017-05-17 | Stop reason: CLARIF

## 2017-05-16 RX ORDER — AMOXICILLIN 250 MG
1-2 CAPSULE ORAL 2 TIMES DAILY
Status: DISCONTINUED | OUTPATIENT
Start: 2017-05-16 | End: 2017-05-18 | Stop reason: HOSPADM

## 2017-05-16 RX ORDER — IBUPROFEN 800 MG/1
800 TABLET, FILM COATED ORAL EVERY 6 HOURS PRN
Status: DISCONTINUED | OUTPATIENT
Start: 2017-05-17 | End: 2017-05-18 | Stop reason: HOSPADM

## 2017-05-16 RX ORDER — CEFAZOLIN SODIUM 2 G/100ML
2 INJECTION, SOLUTION INTRAVENOUS
Status: COMPLETED | OUTPATIENT
Start: 2017-05-16 | End: 2017-05-16

## 2017-05-16 RX ORDER — DIPHENHYDRAMINE HYDROCHLORIDE 50 MG/ML
25 INJECTION INTRAMUSCULAR; INTRAVENOUS EVERY 6 HOURS PRN
Status: DISCONTINUED | OUTPATIENT
Start: 2017-05-16 | End: 2017-05-18 | Stop reason: HOSPADM

## 2017-05-16 RX ORDER — BUPIVACAINE HYDROCHLORIDE AND EPINEPHRINE 2.5; 5 MG/ML; UG/ML
INJECTION, SOLUTION EPIDURAL; INFILTRATION; INTRACAUDAL; PERINEURAL
Status: DISCONTINUED
Start: 2017-05-16 | End: 2017-05-16 | Stop reason: HOSPADM

## 2017-05-16 RX ORDER — LIDOCAINE 40 MG/G
CREAM TOPICAL
Status: DISCONTINUED | OUTPATIENT
Start: 2017-05-16 | End: 2017-05-17 | Stop reason: CLARIF

## 2017-05-16 RX ORDER — KETOROLAC TROMETHAMINE 30 MG/ML
30 INJECTION, SOLUTION INTRAMUSCULAR; INTRAVENOUS EVERY 6 HOURS
Status: COMPLETED | OUTPATIENT
Start: 2017-05-16 | End: 2017-05-17

## 2017-05-16 RX ORDER — OXYTOCIN 10 [USP'U]/ML
INJECTION, SOLUTION INTRAMUSCULAR; INTRAVENOUS PRN
Status: DISCONTINUED | OUTPATIENT
Start: 2017-05-16 | End: 2017-05-18 | Stop reason: HOSPADM

## 2017-05-16 RX ORDER — OXYTOCIN 10 [USP'U]/ML
10 INJECTION, SOLUTION INTRAMUSCULAR; INTRAVENOUS
Status: DISCONTINUED | OUTPATIENT
Start: 2017-05-16 | End: 2017-05-18 | Stop reason: HOSPADM

## 2017-05-16 RX ORDER — DEXTROSE, SODIUM CHLORIDE, SODIUM LACTATE, POTASSIUM CHLORIDE, AND CALCIUM CHLORIDE 5; .6; .31; .03; .02 G/100ML; G/100ML; G/100ML; G/100ML; G/100ML
INJECTION, SOLUTION INTRAVENOUS CONTINUOUS
Status: DISCONTINUED | OUTPATIENT
Start: 2017-05-16 | End: 2017-05-18 | Stop reason: HOSPADM

## 2017-05-16 RX ORDER — EPHEDRINE SULFATE 50 MG/ML
INJECTION, SOLUTION INTRAVENOUS PRN
Status: DISCONTINUED | OUTPATIENT
Start: 2017-05-16 | End: 2017-05-16

## 2017-05-16 RX ORDER — LIDOCAINE HYDROCHLORIDE 10 MG/ML
INJECTION, SOLUTION EPIDURAL; INFILTRATION; INTRACAUDAL; PERINEURAL
Status: DISCONTINUED
Start: 2017-05-16 | End: 2017-05-16 | Stop reason: HOSPADM

## 2017-05-16 RX ORDER — LIDOCAINE HYDROCHLORIDE 10 MG/ML
1 INJECTION, SOLUTION INFILTRATION; PERINEURAL ONCE
Status: DISCONTINUED | OUTPATIENT
Start: 2017-05-16 | End: 2017-05-16

## 2017-05-16 RX ORDER — CITRIC ACID/SODIUM CITRATE 334-500MG
30 SOLUTION, ORAL ORAL
Status: COMPLETED | OUTPATIENT
Start: 2017-05-16 | End: 2017-05-16

## 2017-05-16 RX ORDER — MISOPROSTOL 200 UG/1
400 TABLET ORAL
Status: DISCONTINUED | OUTPATIENT
Start: 2017-05-16 | End: 2017-05-18 | Stop reason: HOSPADM

## 2017-05-16 RX ORDER — OXYTOCIN/0.9 % SODIUM CHLORIDE 30/500 ML
100 PLASTIC BAG, INJECTION (ML) INTRAVENOUS CONTINUOUS
Status: DISCONTINUED | OUTPATIENT
Start: 2017-05-16 | End: 2017-05-18 | Stop reason: HOSPADM

## 2017-05-16 RX ORDER — DIPHENHYDRAMINE HCL 25 MG
25 CAPSULE ORAL EVERY 6 HOURS PRN
Status: DISCONTINUED | OUTPATIENT
Start: 2017-05-16 | End: 2017-05-18 | Stop reason: HOSPADM

## 2017-05-16 RX ORDER — OXYTOCIN 10 [USP'U]/ML
INJECTION, SOLUTION INTRAMUSCULAR; INTRAVENOUS
Status: DISCONTINUED
Start: 2017-05-16 | End: 2017-05-16 | Stop reason: HOSPADM

## 2017-05-16 RX ORDER — LIDOCAINE 40 MG/G
CREAM TOPICAL
Status: DISCONTINUED | OUTPATIENT
Start: 2017-05-16 | End: 2017-05-18 | Stop reason: HOSPADM

## 2017-05-16 RX ORDER — BUPIVACAINE HYDROCHLORIDE 2.5 MG/ML
INJECTION, SOLUTION INFILTRATION; PERINEURAL PRN
Status: DISCONTINUED | OUTPATIENT
Start: 2017-05-16 | End: 2017-05-18 | Stop reason: HOSPADM

## 2017-05-16 RX ORDER — KETOROLAC TROMETHAMINE 30 MG/ML
INJECTION, SOLUTION INTRAMUSCULAR; INTRAVENOUS PRN
Status: DISCONTINUED | OUTPATIENT
Start: 2017-05-16 | End: 2017-05-16

## 2017-05-16 RX ORDER — OXYTOCIN/0.9 % SODIUM CHLORIDE 30/500 ML
340 PLASTIC BAG, INJECTION (ML) INTRAVENOUS CONTINUOUS PRN
Status: DISCONTINUED | OUTPATIENT
Start: 2017-05-16 | End: 2017-05-18 | Stop reason: HOSPADM

## 2017-05-16 RX ORDER — NALBUPHINE HYDROCHLORIDE 10 MG/ML
2.5-5 INJECTION, SOLUTION INTRAMUSCULAR; INTRAVENOUS; SUBCUTANEOUS EVERY 6 HOURS PRN
Status: DISCONTINUED | OUTPATIENT
Start: 2017-05-16 | End: 2017-05-18 | Stop reason: HOSPADM

## 2017-05-16 RX ORDER — OXYCODONE AND ACETAMINOPHEN 5; 325 MG/1; MG/1
1-2 TABLET ORAL EVERY 4 HOURS PRN
Status: DISCONTINUED | OUTPATIENT
Start: 2017-05-16 | End: 2017-05-18 | Stop reason: HOSPADM

## 2017-05-16 RX ORDER — NALOXONE HYDROCHLORIDE 0.4 MG/ML
.1-.4 INJECTION, SOLUTION INTRAMUSCULAR; INTRAVENOUS; SUBCUTANEOUS
Status: DISCONTINUED | OUTPATIENT
Start: 2017-05-16 | End: 2017-05-18 | Stop reason: HOSPADM

## 2017-05-16 RX ORDER — ONDANSETRON 4 MG/1
4 TABLET, ORALLY DISINTEGRATING ORAL EVERY 30 MIN PRN
Status: DISCONTINUED | OUTPATIENT
Start: 2017-05-16 | End: 2017-05-17 | Stop reason: CLARIF

## 2017-05-16 RX ORDER — MORPHINE SULFATE 1 MG/ML
INJECTION, SOLUTION EPIDURAL; INTRATHECAL; INTRAVENOUS PRN
Status: DISCONTINUED | OUTPATIENT
Start: 2017-05-16 | End: 2017-05-16

## 2017-05-16 RX ORDER — HYDROCORTISONE 2.5 %
CREAM (GRAM) TOPICAL 3 TIMES DAILY PRN
Status: DISCONTINUED | OUTPATIENT
Start: 2017-05-16 | End: 2017-05-18 | Stop reason: HOSPADM

## 2017-05-16 RX ORDER — ONDANSETRON 2 MG/ML
4 INJECTION INTRAMUSCULAR; INTRAVENOUS EVERY 6 HOURS PRN
Status: DISCONTINUED | OUTPATIENT
Start: 2017-05-16 | End: 2017-05-18 | Stop reason: HOSPADM

## 2017-05-16 RX ORDER — SIMETHICONE 80 MG
80 TABLET,CHEWABLE ORAL 4 TIMES DAILY PRN
Status: DISCONTINUED | OUTPATIENT
Start: 2017-05-16 | End: 2017-05-18 | Stop reason: HOSPADM

## 2017-05-16 RX ORDER — EPHEDRINE SULFATE 50 MG/ML
5 INJECTION, SOLUTION INTRAMUSCULAR; INTRAVENOUS; SUBCUTANEOUS
Status: DISCONTINUED | OUTPATIENT
Start: 2017-05-16 | End: 2017-05-18 | Stop reason: HOSPADM

## 2017-05-16 RX ORDER — ONDANSETRON 2 MG/ML
4 INJECTION INTRAMUSCULAR; INTRAVENOUS EVERY 30 MIN PRN
Status: DISCONTINUED | OUTPATIENT
Start: 2017-05-16 | End: 2017-05-17 | Stop reason: CLARIF

## 2017-05-16 RX ORDER — LIDOCAINE HYDROCHLORIDE 10 MG/ML
1 INJECTION, SOLUTION EPIDURAL; INFILTRATION; INTRACAUDAL; PERINEURAL ONCE
Status: COMPLETED | OUTPATIENT
Start: 2017-05-16 | End: 2017-05-16

## 2017-05-16 RX ORDER — SODIUM CHLORIDE, SODIUM LACTATE, POTASSIUM CHLORIDE, CALCIUM CHLORIDE 600; 310; 30; 20 MG/100ML; MG/100ML; MG/100ML; MG/100ML
INJECTION, SOLUTION INTRAVENOUS CONTINUOUS
Status: DISCONTINUED | OUTPATIENT
Start: 2017-05-16 | End: 2017-05-18 | Stop reason: HOSPADM

## 2017-05-16 RX ORDER — BUPIVACAINE HYDROCHLORIDE 7.5 MG/ML
INJECTION, SOLUTION INTRASPINAL PRN
Status: DISCONTINUED | OUTPATIENT
Start: 2017-05-16 | End: 2017-05-16

## 2017-05-16 RX ORDER — LANOLIN 100 %
OINTMENT (GRAM) TOPICAL
Status: DISCONTINUED | OUTPATIENT
Start: 2017-05-16 | End: 2017-05-18 | Stop reason: HOSPADM

## 2017-05-16 RX ORDER — SODIUM CHLORIDE, SODIUM LACTATE, POTASSIUM CHLORIDE, CALCIUM CHLORIDE 600; 310; 30; 20 MG/100ML; MG/100ML; MG/100ML; MG/100ML
INJECTION, SOLUTION INTRAVENOUS CONTINUOUS
Status: DISCONTINUED | OUTPATIENT
Start: 2017-05-16 | End: 2017-05-16

## 2017-05-16 RX ORDER — LIDOCAINE HYDROCHLORIDE 20 MG/ML
20 INJECTION, SOLUTION EPIDURAL; INFILTRATION; INTRACAUDAL; PERINEURAL ONCE
Status: DISCONTINUED | OUTPATIENT
Start: 2017-05-16 | End: 2017-05-16

## 2017-05-16 RX ORDER — FENTANYL CITRATE 50 UG/ML
25-50 INJECTION, SOLUTION INTRAMUSCULAR; INTRAVENOUS
Status: DISCONTINUED | OUTPATIENT
Start: 2017-05-16 | End: 2017-05-18 | Stop reason: HOSPADM

## 2017-05-16 RX ORDER — BISACODYL 10 MG
10 SUPPOSITORY, RECTAL RECTAL DAILY PRN
Status: DISCONTINUED | OUTPATIENT
Start: 2017-05-18 | End: 2017-05-18 | Stop reason: HOSPADM

## 2017-05-16 RX ORDER — OXYTOCIN 10 [USP'U]/ML
INJECTION, SOLUTION INTRAMUSCULAR; INTRAVENOUS
Status: DISCONTINUED
Start: 2017-05-16 | End: 2017-05-16 | Stop reason: WASHOUT

## 2017-05-16 RX ADMIN — PHENYLEPHRINE HYDROCHLORIDE 100 MCG: 10 INJECTION, SOLUTION INTRAMUSCULAR; INTRAVENOUS; SUBCUTANEOUS at 11:47

## 2017-05-16 RX ADMIN — EPHEDRINE SULFATE 5 MG: 50 INJECTION INTRAMUSCULAR; INTRAVENOUS; SUBCUTANEOUS at 11:56

## 2017-05-16 RX ADMIN — LIDOCAINE HYDROCHLORIDE 10 MG: 10 INJECTION, SOLUTION EPIDURAL; INFILTRATION; INTRACAUDAL; PERINEURAL at 10:49

## 2017-05-16 RX ADMIN — EPHEDRINE SULFATE 5 MG: 50 INJECTION INTRAMUSCULAR; INTRAVENOUS; SUBCUTANEOUS at 11:53

## 2017-05-16 RX ADMIN — OXYTOCIN-SODIUM CHLORIDE 0.9% IV SOLN 30 UNIT/500ML 100 ML/HR: 30-0.9/5 SOLUTION at 18:00

## 2017-05-16 RX ADMIN — PHENYLEPHRINE HYDROCHLORIDE 100 MCG: 10 INJECTION, SOLUTION INTRAMUSCULAR; INTRAVENOUS; SUBCUTANEOUS at 12:15

## 2017-05-16 RX ADMIN — Medication: at 12:02

## 2017-05-16 RX ADMIN — SODIUM CHLORIDE, POTASSIUM CHLORIDE, SODIUM LACTATE AND CALCIUM CHLORIDE: 600; 310; 30; 20 INJECTION, SOLUTION INTRAVENOUS at 11:09

## 2017-05-16 RX ADMIN — PHENYLEPHRINE HYDROCHLORIDE 100 MCG: 10 INJECTION, SOLUTION INTRAMUSCULAR; INTRAVENOUS; SUBCUTANEOUS at 11:45

## 2017-05-16 RX ADMIN — PHENYLEPHRINE HYDROCHLORIDE 0.5 MCG/KG/MIN: 10 INJECTION, SOLUTION INTRAMUSCULAR; INTRAVENOUS; SUBCUTANEOUS at 11:41

## 2017-05-16 RX ADMIN — EPHEDRINE SULFATE 5 MG: 50 INJECTION INTRAMUSCULAR; INTRAVENOUS; SUBCUTANEOUS at 12:15

## 2017-05-16 RX ADMIN — SODIUM CHLORIDE, POTASSIUM CHLORIDE, SODIUM LACTATE AND CALCIUM CHLORIDE 1000 ML: 600; 310; 30; 20 INJECTION, SOLUTION INTRAVENOUS at 09:50

## 2017-05-16 RX ADMIN — SODIUM CITRATE AND CITRIC ACID MONOHYDRATE 30 ML: 500; 334 SOLUTION ORAL at 11:35

## 2017-05-16 RX ADMIN — Medication: at 12:41

## 2017-05-16 RX ADMIN — CEFAZOLIN SODIUM 2 G: 2 INJECTION, SOLUTION INTRAVENOUS at 11:07

## 2017-05-16 RX ADMIN — MORPHINE SULFATE 0.2 MG: 5 INJECTION, SOLUTION INTRAVENOUS at 11:40

## 2017-05-16 RX ADMIN — PHENYLEPHRINE HYDROCHLORIDE 100 MCG: 10 INJECTION, SOLUTION INTRAMUSCULAR; INTRAVENOUS; SUBCUTANEOUS at 12:03

## 2017-05-16 RX ADMIN — OXYTOCIN 10 UNITS: 10 INJECTION, SOLUTION INTRAMUSCULAR; INTRAVENOUS at 12:02

## 2017-05-16 RX ADMIN — Medication: at 15:38

## 2017-05-16 RX ADMIN — EPHEDRINE SULFATE 5 MG: 50 INJECTION INTRAMUSCULAR; INTRAVENOUS; SUBCUTANEOUS at 11:50

## 2017-05-16 RX ADMIN — Medication: at 15:51

## 2017-05-16 RX ADMIN — EPHEDRINE SULFATE 5 MG: 50 INJECTION INTRAMUSCULAR; INTRAVENOUS; SUBCUTANEOUS at 11:47

## 2017-05-16 RX ADMIN — BUPIVACAINE HYDROCHLORIDE IN DEXTROSE 1.6 ML: 7.5 INJECTION, SOLUTION SUBARACHNOID at 11:40

## 2017-05-16 RX ADMIN — KETOROLAC TROMETHAMINE 30 MG: 30 INJECTION, SOLUTION INTRAMUSCULAR at 19:05

## 2017-05-16 RX ADMIN — KETOROLAC TROMETHAMINE 30 MG: 30 INJECTION, SOLUTION INTRAMUSCULAR at 12:44

## 2017-05-16 RX ADMIN — BUPIVACAINE HYDROCHLORIDE 10 ML: 2.5 INJECTION, SOLUTION EPIDURAL; INFILTRATION; INTRACAUDAL; PERINEURAL at 12:33

## 2017-05-16 NOTE — ANESTHESIA CARE TRANSFER NOTE
Patient: Kirti Berry    Procedure(s):  repeat low transverse  section - Wound Class: I-Clean    Diagnosis: term pregnancy with previous uterine scar  Diagnosis Additional Information: No value filed.    Anesthesia Type:   Spinal     Note:  Airway :Room Air  Patient transferred to:PACU        Vitals: (Last set prior to Anesthesia Care Transfer)    CRNA VITALS  2017 1221 - 2017 1256      2017             SpO2: 100 %                Electronically Signed By: DAYNA Caballero CRNA  May 16, 2017  12:56 PM

## 2017-05-16 NOTE — ANESTHESIA PROCEDURE NOTES
Peripheral nerve/Neuraxial procedure note : intrathecal  Pre-Procedure  Performed by  DANN RAMOS   Location: OR    Procedure Times:5/16/2017 11:32 AM and 5/16/2017 11:40 AM  Pre-Anesthestic Checklist: patient identified, IV checked, risks and benefits discussed, informed consent, monitors and equipment checked and pre-op evaluation    Timeout  Correct Patient: Yes   Correct Procedure: Yes   Correct Site: Yes   Correct Laterality: N/A   Correct Position: Yes   Site Marked: N/A   .   Procedure Documentation    .    Procedure:    Intrathecal.  Insertion Site:L3-4  (midline approach)      Patient Prep;mask, sterile gloves, povidone-iodine 7.5% surgical scrub, patient draped.  .  Needle: (). # of attempts: 1. # of redirects:. Spinal Needle: Sonja tip 25 G. 3.5 in.  Introducer used. Introducer: 20 G. .     Assessment/Narrative  Paresthesias: No.  .  .  clear CSF fluid removed while sitting   . Sensory Level: T7 Comments:  Pt tolerated procedure well.  One pass, no paresthesia's.

## 2017-05-16 NOTE — PLAN OF CARE
Problem: Postpartum ( Delivery) (Adult)  Goal: Signs and Symptoms of Listed Potential Problems Will be Absent or Manageable (Postpartum)  Signs and symptoms of listed potential problems will be absent or manageable by discharge/transition of care (reference Postpartum ( Delivery) (Adult) CPG).  S:  Section Delivery; 1201    B: Repeat  Section at 39 2/7 weeks  A: Baby delivered, cord clamping delayed for 60-90 seconds, then brought to pre- warmed infant warmer. Infant stimulated and dried. Infant then brought to mother at 1206 and placed skin to skin for bonding. Apgars 9/9. Educated mother on expected feeding readiness cues and encouraged her to observe for feeding cues. Mother informed that breast feeding assistance would be provided.   R: Mother and baby bonding well. Anticipate first feed within the hour.

## 2017-05-16 NOTE — PROGRESS NOTES
S: Scheduled C/S  B: Patient is a  @ 39w1d gestation with history of previous C/S  A: [facility]. VS stable. 20 minute category 1 FHT strip obtained. IV placed, fluids initiated. Procedure explained to patient, patient educated on importance of skin-to-skin contact and intent for skin-to-skin initiation in OR. Questions answered. Consents signed. Patient prepped for surgery.  R: Will proceed with scheduled C/S as planned.

## 2017-05-16 NOTE — ANESTHESIA PREPROCEDURE EVALUATION
Anesthesia Evaluation     . Pt has had prior anesthetic. Type: Regional           ROS/MED HX    ENT/Pulmonary:  - neg pulmonary ROS     Neurologic:  - neg neurologic ROS     Cardiovascular:  - neg cardiovascular ROS       METS/Exercise Tolerance:     Hematologic:  - neg hematologic  ROS       Musculoskeletal:  - neg musculoskeletal ROS       GI/Hepatic:  - neg GI/hepatic ROS       Renal/Genitourinary:  - ROS Renal section negative       Endo:  - neg endo ROS       Psychiatric:  - neg psychiatric ROS       Infectious Disease:  - neg infectious disease ROS       Malignancy:      - no malignancy   Other:                     Physical Exam  Normal systems: cardiovascular, pulmonary and dental    Airway   Mallampati: III  TM distance: >3 FB  Neck ROM: full    Dental     Cardiovascular   Rhythm and rate: regular and normal      Pulmonary    breath sounds clear to auscultation                    Anesthesia Plan      History & Physical Review  History and physical reviewed and following examination; no interval change.    ASA Status:  2 .    NPO Status:  > 8 hours    Plan for Spinal   PONV prophylaxis:  Ondansetron (or other 5HT-3) and Dexamethasone or Solumedrol       Postoperative Care  Postoperative pain management:  IV analgesics, Oral pain medications and Multi-modal analgesia.      Consents  Anesthetic plan, risks, benefits and alternatives discussed with:  Patient..                          .

## 2017-05-16 NOTE — IP AVS SNAPSHOT
MRN:[id number]                      After Visit Summary   5/16/2017    Kirti Berry    MRN: [id number]           Thank you!     Thank you for choosing [facility] for your care. Our goal is always to provide you with excellent care. Hearing back from our patients is one way we can continue to improve our services. Please take a few minutes to complete the written survey that you may receive in the mail after you visit with us. Thank you!        Patient Information     Date Of Birth          1980        Designated Caregiver       Most Recent Value    Caregiver    Will someone help with your care after discharge? no    Name of designated caregiver Chandu      About your hospital stay     You were admitted on:  May 16, 2017 You last received care in the:  [facility]    You were discharged on:  May 18, 2017       Who to Call     For medical emergencies, please call 911.  For non-urgent questions about your medical care, please call your primary care provider or clinic, [phone]  For questions related to your surgery, please call your surgery clinic        Attending Provider     Provider Specialty    Ward Eisenberg MD [facility]    Mickey Gann MD Family Practice       Primary Care Provider Office Phone # Fax #    Mickey Gann [phone]       [facility] [address] [facility] [address]        Further instructions from your care team       [facility] Discharge Instructions     Discharge disposition:  Discharged to home       Diet:  Regular       Activity Lifting restricted to 20 pounds for the first 2 wks, then increase by 10 pounds every week thereafter.  No tub soaks for >15 minutes the first 2 wks then as desired.  No driving for 2 week(s).  No sex for 6 week(s).        Follow-up: Follow up with primary care provider in 6 weeks.  When she makes her appt she should mention that she wants a copper IUD placed at  the same time.         Additional instructions: Wound care / dressings: may shower and keep wound clean and dry        Postop  Birth Instructions    Activity       Do not lift more than 10 pounds for 6 weeks after surgery.  Ask family and friends for help when you need it.    No driving until you have stopped taking your pain medications (usually two weeks after surgery).    No heavy exercise or activity for 6 weeks.  Don't do anything that will put a strain on your surgery site.    Don't strain when using the toilet.  Your care team may prescribe a stool softener if you have problems with your bowel movements.     To care for your incision:       Keep the incision clean and dry.    Do not soak your incision in water. No swimming or hot tubs until it has fully healed. You may soak in the bathtub if the water level is below your incision.    Do not use peroxide, gel, cream, lotion, or ointment on your incision.    Adjust your clothes to avoid pressure on your surgery site (check the elastic in your underwear for example).     You may see a small amount of clear or pink drainage and this is normal.  Check with your health care provider:       If the drainage increases or has an odor.    If the incision reddens, you have swelling, or develop a rash.    If you have increased pain and the medicine we prescribed doesn't help.    If you have a fever above 100.4 F (38 C) with or without chills when placing thermometer under your tongue.   The area around your incision (surgery wound), will feel numb.  This is normal. The numbness should go away in less than a year.     Keep your hands clean:  Always wash your hands before touching your incision (surgery wound). This helps reduce your risk of infection. If your hands aren't dirty, you may use an alcohol hand-rub to clean your hands. Keep your nails clean and short.    Call your healthcare provider if you have any of these symptoms:       You soak a sanitary pad with  blood within 1 hour, or you see blood clots larger than a golf ball.    Bleeding that lasts more than 6 weeks.    Vaginal discharge that smells bad.    Severe pain, cramping or tenderness in your lower belly area.    A need to urinate more frequently (use the toilet more often), more urgently (use the toilet very quickly), or it burns when you urinate.    Nausea and vomiting.    Redness, swelling or pain around a vein in your leg.    Problems breastfeeding or a red or painful area on your breast.    Chest pain and cough or are gasping for air.    Problems with coping with sadness, anxiety or depression. If you have concerns about hurting yourself or the baby, call your provider immediately.      You have questions or concerns after you return home.                  Pending Results     No orders found from 5/14/2017 to 5/17/2017.            Statement of Approval     Ordered          05/18/17 0819  I have reviewed and agree with all the recommendations and orders detailed in this document.  EFFECTIVE NOW     Approved and electronically signed by:  Ward Eisenberg MD             Admission Information     Date & Time Provider Department Dept. Phone    5/16/2017 [facility] [phone]      Your Vitals Were     Blood Pressure Pulse Temperature Respirations Last Period Pulse Oximetry    102/65 80 97.1  F (36.2  C) (Oral) 18 08/05/2016 (Approximate) 98%      MyChart Information     [email or web address] gives you secure access to your electronic health record. If you see a primary care provider, you can also send messages to your care team and make appointments. If you have questions, please call your primary care clinic.  If you do not have a primary care provider, please call [phone] and they will assist you.        Care EveryWhere ID     This is your Care EveryWhere ID. This could be used by other organizations to access your [facility] medical records  [id number]           Review of  your medicines      START taking        Dose / Directions    ibuprofen 800 MG tablet   Commonly known as:  ADVIL/MOTRIN   Used for:  Status post  delivery        Dose:  800 mg   Take 1 tablet (800 mg) by mouth every 6 hours as needed for other (cramping)   Quantity:  90 tablet   Refills:  1       oxyCODONE-acetaminophen 5-325 MG per tablet   Commonly known as:  PERCOCET   Used for:  Status post  delivery        Dose:  1-2 tablet   Take 1-2 tablets by mouth every 4 hours as needed for moderate to severe pain   Quantity:  30 tablet   Refills:  0         CONTINUE these medicines which have NOT CHANGED        Dose / Directions    Fish Oil 645 MG Caps        Refills:  0       prenatal multivitamin  plus iron 27-0.8 MG Tabs per tablet        Dose:  1 tablet   Take 1 tablet by mouth daily   Quantity:  100 tablet   Refills:  3         STOP taking     rho(D) immune globulin 300 MCG injection   Commonly known as:  HYPERRHO/RHOGAM                Where to get your medicines      These medications were sent to [facility] [address]   [address] , [facility] [address]     Phone:  [phone]     ibuprofen 800 MG tablet         Some of these will need a paper prescription and others can be bought over the counter. Ask your nurse if you have questions.     Bring a paper prescription for each of these medications     oxyCODONE-acetaminophen 5-325 MG per tablet                Protect others around you: Learn how to safely use, store and throw away your medicines at www.disposemymeds.org.             Medication List: This is a list of all your medications and when to take them. Check marks below indicate your daily home schedule. Keep this list as a reference.      Medications           Morning Afternoon Evening Bedtime As Needed    Fish Oil 645 MG Caps                                ibuprofen 800 MG tablet   Commonly known as:  ADVIL/MOTRIN   Take 1 tablet (800 mg) by mouth  every 6 hours as needed for other (cramping)   Last time this was given:  800 mg on 5/18/2017  6:33 AM                                oxyCODONE-acetaminophen 5-325 MG per tablet   Commonly known as:  PERCOCET   Take 1-2 tablets by mouth every 4 hours as needed for moderate to severe pain                                prenatal multivitamin  plus iron 27-0.8 MG Tabs per tablet   Take 1 tablet by mouth daily

## 2017-05-16 NOTE — IP AVS SNAPSHOT
[facility]    [address]     [place] [address]    Phone:  [phone]                                       After Visit Summary   5/16/2017    Kirti Berry    MRN: [id number]           After Visit Summary Signature Page     I have received my discharge instructions, and my questions have been answered. I have discussed any challenges I see with this plan with the nurse or doctor.    ..........................................................................................................................................  Patient/Patient Representative Signature      ..........................................................................................................................................  Patient Representative Print Name and Relationship to Patient    ..................................................               ................................................  Date                                            Time    ..........................................................................................................................................  Reviewed by Signature/Title    ...................................................              ..............................................  Date                                                            Time

## 2017-05-16 NOTE — PLAN OF CARE
Problem: Postpartum ( Delivery) (Adult)  Goal: Signs and Symptoms of Listed Potential Problems Will be Absent or Manageable (Postpartum)  Signs and symptoms of listed potential problems will be absent or manageable by discharge/transition of care (reference Postpartum ( Delivery) (Adult) CPG).  Transferred client from recovery in 330 to  355 at 1408 via postpartum bed. Oriented to call light and room. Encourage ice chips and only sips water for now; denies any nausea or itching.

## 2017-05-16 NOTE — PROGRESS NOTES
Patient was nauseated with small emesis requested Zofran IV CHANGED MIND AFTER BROUGHT TO THE BEDSIDE.

## 2017-05-16 NOTE — L&D DELIVERY NOTE
PREOPERATIVE DIAGNOSES:   1. Term intrauterine pregnancy at 39 1/7 weeks gestation.   2. Scheduled RLTCS due to a previous uterine scar     POSTOPERATIVE DIAGNOSIS:   1. Term intrauterine pregnancy at 39 1/7 weeks gestation.   2. Scheduled RLTCS due to a previous uterine scar     PROCEDURE: Repeat low transverse  section.     SURGEON: Elgin   PRIMARY: Sanjuanita  ASSISTANT: Sanjuanita TELLO/Shaun MS4     Dr. Gann was asked to assist in the  Section because of the special skill set he possess in Myotomy repair, fascial closure, and special skin closure techniques.  Dr. Gann was a vital assistant in all of the above listed procedures. His skills allowed us to shorten the length of the procedure considerably, which has been shown to decrease risk of infection, decrease post operative morbidity, and improve outcomes.     ANESTHESIA: Spinal with Alejandro Armijo CRNA     INDICATIONS: Multiparous patient who is at 39 1/7 weeks' gestation. Please see my consult or EPIC note for details. Essentially she was a scheduled RLTCS.    PROCEDURE: Kirti was taken to the operating room where spinal anesthesia was placed. She was then laid in the supine position where she was prepped and draped in the routine sterile fashion after a Grajeda catheter was placed. Under adequate anesthesia, a Pfannenstiel incision was made with a #15 blade. The incision was extended down through the subcutaneous tissue with blunt and sharp dissection, cauterizing any bleeding as we went. The midline of the fascia was scored with a #15 blade and carried transversely in both directions with a curved Doyle scissors. The fascia was then dissected off the anterior surface of the rectus muscle inferiorly and superiorly with both sharp and blunt dissection. Again, any bleeding was cauterized and taken care of at the time. The rectus muscles were then  in the midline and entered with sharp dissection. This was then extended with blunt  dissection in both directions until adequate space was appreciated. A bladder flap was created by tenting the uterovesicular peritoneum with DeBakeys and incising with a Metzenbaum scissors.  With good exposure, we then incised the uterus with a #15 blade, carefully getting down to the final layer. This was then broken through with a Hansa and then the uterine incision was extended with finger dissection in both directions transversely. The presenting part was identified; it was then delivered through the abdominal incision atraumatically. The shoulders and remainder of the body were then delivered and spontaneous crying and respirations were noted with this viable . Suctioning through the mouth and nares was done at this time. The cord was clamped x2 and cut and the baby brought over to the warming table and nursing staff by the assistant. Cord bloods were then obtained.     The placenta was then removed manually intact. The cut surface of the uterus was then isolated with Bonds clamps. The uterine incision was closed using 0 Vicryl in a running locked fashion, followed by a second imbricating layer of 0 Monocryl. Any bleeding was controlled with figure-of-eight sutures. Hemostasis was excellent.     The bladder flap was not repaired.     Irrigation was then done to remove any blood clots and debris. On reinspection, the uterine scar was dry and clear of any bleeding.     The parietal peritoneum was then closed using 0 Vicryl in a running fashion. The rectus muscles were brought together to the midline with 0 Vicryl interrupted sutures.     The corners of the fascia on either end were isolated with Kocher clamps. I then placed the first On-Q catheter underneath the fascia by taking a lateral approach. The fascia was then closed over the top of this with 0 Vicryl in a running fashion. The second On-Q catheter was then placed above the fascia in the subq layer and the subq tissue was closed by using   3  interrupted sutures of 2-0 plain catgut suture. The skin was then closed with Insorb resorbable staples and sealed with Dermabond. I also sealed the catheter sites of the On-Q pump with Dermabond and placed a Tegaderm over the top of this. A sterile pressure dressing was then placed over the abdominal incision.     VARIATIONS: None .     ESTIMATED BLOOD LOSS: 500 mL.     TOTAL FLUIDS IN: 1000 ml of LR.     TOTAL URINE OUTPUT: About 200 mL of clear urine.     Both mom and this female  with apgars of 9 and 9 at 1 and 5 min respectively, which weighed 7 pounds 9 ounces or 3420 grams were stable upon me leaving the delivery room.     OPERATING ROOM TIMES:   Into the operating room at 1133, time of incision 1154, time of delivery 1201, and time of closure 1245.     Electronically signed by:  Ward Eisenberg M.D.  2017

## 2017-05-16 NOTE — OR NURSING
Transfer from  PACU (334) to Room 355.  On bed from OR.    S: 35 y/o female  S/P  Section       Anesthesia Type:  Spinal       Surgeon:  Dr. Eisenberg       Allergies:  See Medication Reconciliation Record       DNR: No    B:  Pertinent Medical History:   Past Medical History:   Diagnosis Date     Low-lying placenta in second trimester (resolved) 2017     NO ACTIVE PROBLEMS (aka NONE)      Supervision of high-risk pregnancy 2017              Surgical History:    Past Surgical History:   Procedure Laterality Date      SECTION N/A 2015    Procedure:  SECTION;  Surgeon: Ward Eisenberg MD;  Location:  L+D         A:  EBL: 500 mL        IVF:  50 mL        UOP:  100 mL        NPO:  ___Yes _x__No         Vomiting:  ___Yes _x__No         Drainage: None        Skin Integrity: Normal     Retained foreign object:  Grajeda catheter.  Passed on in report.         See PACU record for ongoing assessment, vital signs and pain assessment.    R: Post-Op vitals and assessments as ordered/indicated per patient's condition.       Follow Post-Op orders and notify Physician prn.       Continue to involve patient/family in plan of care and discharge planning.       Reinforce Pre-Operative education.       Implement skin safety interventions as appropriate.    Name: Effie Mai

## 2017-05-17 LAB — HGB BLD-MCNC: 10.2 G/DL (ref 11.7–15.7)

## 2017-05-17 PROCEDURE — 25000128 H RX IP 250 OP 636: Performed by: FAMILY MEDICINE

## 2017-05-17 PROCEDURE — 85018 HEMOGLOBIN: CPT | Performed by: FAMILY MEDICINE

## 2017-05-17 PROCEDURE — 25000132 ZZH RX MED GY IP 250 OP 250 PS 637: Performed by: FAMILY MEDICINE

## 2017-05-17 PROCEDURE — 12000029 ZZH R&B OB INTERMEDIATE

## 2017-05-17 PROCEDURE — 36415 COLL VENOUS BLD VENIPUNCTURE: CPT | Performed by: FAMILY MEDICINE

## 2017-05-17 RX ADMIN — KETOROLAC TROMETHAMINE 30 MG: 30 INJECTION, SOLUTION INTRAMUSCULAR at 00:37

## 2017-05-17 RX ADMIN — SENNOSIDES AND DOCUSATE SODIUM 2 TABLET: 8.6; 5 TABLET ORAL at 09:54

## 2017-05-17 RX ADMIN — KETOROLAC TROMETHAMINE 30 MG: 30 INJECTION, SOLUTION INTRAMUSCULAR at 06:31

## 2017-05-17 RX ADMIN — IBUPROFEN 800 MG: 800 TABLET ORAL at 18:26

## 2017-05-17 RX ADMIN — KETOROLAC TROMETHAMINE 30 MG: 30 INJECTION, SOLUTION INTRAMUSCULAR at 12:35

## 2017-05-17 RX ADMIN — SENNOSIDES AND DOCUSATE SODIUM 2 TABLET: 8.6; 5 TABLET ORAL at 21:04

## 2017-05-17 NOTE — PLAN OF CARE
Problem: Postpartum ( Delivery) (Adult)  Goal: Signs and Symptoms of Listed Potential Problems Will be Absent or Manageable (Postpartum)  Signs and symptoms of listed potential problems will be absent or manageable by discharge/transition of care (reference Postpartum ( Delivery) (Adult) CPG).   Outcome: Improving  Saline locked IV, no nausea and declines need for any other prn meds for pain. Getting IV Toradol every 6 hours. Independent with breastfeeding, using a shield on the right side if unable to get baby to latch properly.

## 2017-05-17 NOTE — ANESTHESIA POSTPROCEDURE EVALUATION
Patient: Kirti Berry    Procedure(s):  repeat low transverse  section - Wound Class: I-Clean    Diagnosis:term pregnancy with previous uterine scar  Diagnosis Additional Information: No value filed.    Anesthesia Type:  Spinal    Note:  Anesthesia Post Evaluation    Patient location during evaluation: Bedside  Patient participation: Able to fully participate in evaluation  Level of consciousness: awake and awake and alert  Pain management: adequate  Airway patency: patent  Cardiovascular status: acceptable, stable and blood pressure returned to baseline  Respiratory status: acceptable, room air and nonlabored ventilation  Hydration status: acceptable  PONV: none     Anesthetic complications: None    Comments: Patient was happy with the anesthesia care received and no anesthesia related complications were noted.  I will follow up with the patient again if it is needed.        Last vitals:  Vitals:    17 0008 17 0500 17 0625   BP: 94/55 (!) 89/49 (!) 87/46   Pulse: 67 62 63   Resp: 18 16 16   Temp: 98.2  F (36.8  C) 96.8  F (36  C)    SpO2: 99% 98%          Electronically Signed By: DAYNA Caballero CRNA  May 17, 2017  8:02 AM

## 2017-05-17 NOTE — PROGRESS NOTES
S/O  Stopped and saw the patient this morning.  She is complaining of no significant pain and having no other symptoms.  Her BP is a bit low but she is not lightheaded or tachycardic.    BP (!) 89/53  Pulse 78  Temp 98.1  F (36.7  C) (Oral)  Resp 16  LMP 08/05/2016 (Approximate)  SpO2 98%  Breastfeeding? Unknown  Exam:  Her dressing is intact without significant drainage.     A/P  POD #1 doing very well s/p RLTCS    Will monitor her vitals, if she develops symptoms of hypotension she may need more IV fluids.  Her Hgb is stable.  We will look at her wound tomorrow.     Electronically signed by:  Ward Eisenberg M.D.  5/17/2017

## 2017-05-17 NOTE — PROGRESS NOTES
S: Shift review  B:Kirti is a , day 1 post repeat  birth.  A: Stable post-op, incisional dressing is dry & intact , Lung sounds-clear, bowel sounds active in all 4 quadrants, independent with mobility, pain control achieved with p.o. pain meds. Handles baby with confidence. Runs a lower blood pressure, patient denies being light headed or dizzy. Is independently moving around in her room. Her SO and her are watching baby care videos and working on paperwork. SL remains in incase IV fluids are needed for low blood pressure.   R: Continue with plan of care. Offer pain meds routinely.

## 2017-05-17 NOTE — PROGRESS NOTES
"[facility] Obstetrics Post-Op / Progress Note         Assessment and Plan:    Assessment:   Kirti Berry is a 36 year old  [facility]  L&D complications: Hypotension on POD#1 to 80s/40s, no tachycardia      Doing well.  Clean wound without signs of infection.  No immediate surgical complications identified.  No excessive bleeding (Hgb 12.5 -> 10.9)  Pain well-controlled.      Plan:     Encouraged oral fluid intake, consider IV fluid bolus if patient becomes symptomatic of hypotension with activity    Ambulation encouraged, warned against getting up too quickly as patient may experience some lightheadedness due to hypotension    Breast feeding strategies discussed    Monitor wound for signs of infection    Pain control measures as needed    Reportable signs and symptoms dicussed with the patient    RhoGam not needed as baby was AB negative    Patient nonimmune to rubella, declined MMR vaccine prior to discharge    Anticipate discharge in 2 days           Interval History:   Kirti is doing well. Pain is well-controlled with IV Toradol. No fevers. No history of wound drainage, warmth or significant erythema. Denies chest pain, shortness of breath, nausea or vomiting. Was up to chair last night, denies lightheadedness or dizziness. Breastfeeding going fairly well, using nipple shield occasionally on right.            Significant Problems:   Hypotension to 80s/40s this AM, normal blood pressures overnight          Review of Systems:    The patient denies any chest pain, shortness of breath, excessive pain, fever, chills, purulent drainage from the wound, nausea or vomiting.          Medications:     All medications related to the patient's surgery have been reviewed  Current Facility-Administered Medications   Medication     BUPivacaine 0.5% (MARCAINE) 400 mL in ON-Q  4 mL/hr (-G holds 400-500 mL) 5\" dual cath disposable pump     lidocaine 1 % 1 mL     lidocaine (LMX4) kit     sodium " chloride (PF) 0.9% PF flush 3 mL     sodium chloride (PF) 0.9% PF flush 3 mL     oxytocin (PITOCIN) 30 units in 500 mL 0.9% NaCl infusion     dextrose 5% in lactated ringers infusion     naloxone (NARCAN) injection 0.1-0.4 mg     senna-docusate (SENOKOT-S;PERICOLACE) 8.6-50 MG per tablet 1-2 tablet     [START ON 5/18/2017] bisacodyl (DULCOLAX) Suppository 10 mg     [START ON 5/18/2017] sodium phosphate (FLEET ENEMA) 1 enema     ondansetron (ZOFRAN) injection 4 mg     hydrocortisone 2.5 % cream     diphenhydrAMINE (BENADRYL) capsule 25 mg    Or     diphenhydrAMINE (BENADRYL) injection 25 mg     lanolin ointment     simethicone (MYLICON) chewable tablet 80 mg     lactated ringers BOLUS 1,000 mL     oxytocin (PITOCIN) 30 units in 500 mL 0.9% NaCl infusion     oxytocin (PITOCIN) injection 10 Units     misoprostol (CYTOTEC) tablet 400 mcg     Blood Bank will determine if patient is eligible for and the proper dosage of rho (D) immune globulin     oxyCODONE-acetaminophen (PERCOCET) 5-325 MG per tablet 1-2 tablet     ketorolac (TORADOL) injection 30 mg     ibuprofen (ADVIL/MOTRIN) tablet 800 mg     No MMR Needed -  Assessment: Patient does not need MMR vaccine     No Tdap Needed - Assessment: Patient does not need Tdap vaccine     lidocaine 1 % 1 mL     lidocaine (LMX4) kit     medication instruction     lactated ringers BOLUS 250 mL     ePHEDrine injection 5 mg     nalbuphine (NUBAIN) injection 2.5-5 mg     naloxone (NARCAN) injection 0.1-0.4 mg     Opioid plan postpartum - medication instruction     lactated ringers infusion     fentaNYL Citrate (PF) (SUBLIMAZE) injection 25-50 mcg     ondansetron (ZOFRAN-ODT) ODT tab 4 mg    Or     ondansetron (ZOFRAN) injection 4 mg     prochlorperazine (COMPAZINE) injection 5-10 mg     oxytocin (PITOCIN) injection     bupivacaine (MARCAINE) 0.25 % injection           Physical Exam:     Vital signs stable except for hypotension this AM  Patient Vitals for the past 12 hrs:   BP Temp  Temp src Pulse Heart Rate Resp SpO2   17 0625 (!) 87/46 - - 63 - 16 -   17 0500 (!) 89/49 96.8  F (36  C) Oral 62 - 16 98 %   17 0008 94/55 98.2  F (36.8  C) Oral 67 67 18 99 %   17 1915 104/68 - - 68 - 18 99 %   Gen: Well-appearing postpartum female, resting in bed with   CV: RRR, no murmurs, no LE edema  Resp: Lungs clear to auscultation bilaterally  Abd: Uterine fundus firm at the level of the umbilicus. Incisional dressing is clean/dry/intact, no surrounding erythema noted.            Data:     All laboratory data related to this surgery reviewed  Hemoglobin   Date Value Ref Range Status   2017 10.2 (L) 11.7 - 15.7 g/dL Final   05/15/2017 12.5 11.7 - 15.7 g/dL Final   2017 12.0 11.7 - 15.7 g/dL Final   2017 13.8 11.7 - 15.7 g/dL Final   2016 12.9 11.7 - 15.7 g/dL Final     I, Priya Dunn, MS4, am serving as a scribe; to document services personally performed by Ward Eisenberg MD - based on data collection and the provider's statements to me.    Provider Disclosure:  I agree with above History, Review of Systems, Physical exam and Plan. I have reviewed the content of the documentation and have edited it as needed. I have personally performed the services documented here and the documentation accurately represents those services and the decisions I have made.      Electronically signed by Mickey Gann MD

## 2017-05-18 VITALS
SYSTOLIC BLOOD PRESSURE: 102 MMHG | TEMPERATURE: 97.1 F | OXYGEN SATURATION: 98 % | DIASTOLIC BLOOD PRESSURE: 65 MMHG | HEART RATE: 80 BPM | RESPIRATION RATE: 18 BRPM

## 2017-05-18 PROCEDURE — 25000132 ZZH RX MED GY IP 250 OP 250 PS 637: Performed by: FAMILY MEDICINE

## 2017-05-18 RX ORDER — IBUPROFEN 800 MG/1
800 TABLET, FILM COATED ORAL EVERY 6 HOURS PRN
Qty: 90 TABLET | Refills: 1 | Status: SHIPPED | OUTPATIENT
Start: 2017-05-18 | End: 2017-07-06

## 2017-05-18 RX ORDER — OXYCODONE AND ACETAMINOPHEN 5; 325 MG/1; MG/1
1-2 TABLET ORAL EVERY 4 HOURS PRN
Qty: 30 TABLET | Refills: 0 | Status: SHIPPED | OUTPATIENT
Start: 2017-05-18 | End: 2017-07-06

## 2017-05-18 RX ADMIN — SENNOSIDES AND DOCUSATE SODIUM 1 TABLET: 8.6; 5 TABLET ORAL at 08:37

## 2017-05-18 RX ADMIN — IBUPROFEN 800 MG: 800 TABLET ORAL at 06:33

## 2017-05-18 RX ADMIN — IBUPROFEN 800 MG: 800 TABLET ORAL at 00:48

## 2017-05-18 RX ADMIN — IBUPROFEN 800 MG: 800 TABLET ORAL at 13:05

## 2017-05-18 NOTE — DISCHARGE SUMMARY
[facility] Discharge Summary    Kirti Berry MRN# [id number]   Age: 36 year old YOB: 1980     Date of Admission:  2017  Date of Discharge::  2017  Admitting Physician:  Ward Eisenberg MD  Discharge Physician:  Ward Eisenberg MD     Home clinic: [facility]          Admission Diagnoses:   Term pregnancy with previous uterine scar  Previous  delivery, antepartum condition or complication         Discharge Diagnosis:   Term pregnancy with previous uterine scar s/p RLTCS  Nonimmune to rubella, declined MMR vaccine          Procedures:   Procedure(s): Repeat low transverse  section       No other procedures performed during this admission           Medications Prior to Admission:     Prescriptions Prior to Admission   Medication Sig Dispense Refill Last Dose     Omega-3 Fatty Acids (FISH OIL) 645 MG CAPS    Past Week at Unknown time     Prenatal Vit-Fe Fumarate-FA (PRENATAL MULTIVITAMIN  PLUS IRON) 27-0.8 MG TABS Take 1 tablet by mouth daily 100 tablet 3 5/15/2017 at Unknown time     rho,D, immune globulin (HYPERRHO/RHOGAM) 300 MCG injection Inject 300 mcg into the muscle once 1 mcg 0 More than a month at Unknown time           Discharge Medications:     Current Discharge Medication List      START taking these medications    Details   oxyCODONE-acetaminophen (PERCOCET) 5-325 MG per tablet Take 1-2 tablets by mouth every 4 hours as needed for moderate to severe pain  Qty: 30 tablet, Refills: 0    Associated Diagnoses: Status post  delivery      ibuprofen (ADVIL/MOTRIN) 800 MG tablet Take 1 tablet (800 mg) by mouth every 6 hours as needed for other (cramping)  Qty: 90 tablet, Refills: 1    Associated Diagnoses: Status post  delivery         CONTINUE these medications which have NOT CHANGED    Details   Omega-3 Fatty Acids (FISH OIL) 645 MG CAPS       Prenatal Vit-Fe Fumarate-FA (PRENATAL MULTIVITAMIN  PLUS IRON) 27-0.8 MG TABS Take 1  tablet by mouth daily  Qty: 100 tablet, Refills: 3         STOP taking these medications       rho,D, immune globulin (HYPERRHO/RHOGAM) 300 MCG injection Comments:   Reason for Stopping:                     Consultations:   No consultations were requested during this admission          Brief History of Labor or Admission:   [facility] 39w1d gestation for scheduled RLTCS due to previous uterine scar. She was taken to the OR and RLTCS was performed without complication, resulting in delivery of healthy baby girl.           Hospital Course:   The patient's hospital course was unremarkable.  She recovered as anticipated and experienced no post-operative complications other than some low blood pressures but without tachycardia or lightheadedness. On discharge, her pain was well controlled with PO ibuprofen. Vaginal bleeding is similar to a menstrual flow.  Voiding without difficulty. Passing flatus, but no BM yet.  Ambulating well without lightheadedness and tolerating a normal diet. No fever or significant wound drainage.  Breastfeeding well.  Infant is stable. Patient is interested in ParaGard IUD postpartum.  She was discharged on post-partum day #3.    Post-partum hemoglobin:   Hemoglobin   Date Value Ref Range Status   2017 10.2 (L) 11.7 - 15.7 g/dL Final           Discharge Instructions and Follow-Up:   Discharge diet: Regular   Discharge activity: - Lifting restricted to 20 pounds for the first 2 wks, then increase by 10 pounds every week thereafter.  - No tub soaks for >15 minutes the first 2 wks then as desired.  - No driving for 2 week(s).  - No sex for 6 week(s).   Discharge follow-up: Follow up with Dr. Eisenberg in 4-5 days for  weight check and check of incision   Wound care: Drink plenty of fluids  Ice to area for comfort  Keep wound clean and dry           Discharge Disposition:   Discharged to home      Patient was seen and examined by myself and   [place].  The note was then scribed by me.    Priya Dunn, MS4    Attestation:  I have reviewed today's vital signs, notes, medications, labs and imaging.  Amount of time performed on this discharge summary: 15 minutes.       This patient was seen and examined by myself as well as the medical student.  The medical student scribed the note and I have reviewed it, edited it appropriately, and agree with the final documentation.     Electronically signed by:  Ward Eisenberg M.D.  5/18/2017

## 2017-05-18 NOTE — PLAN OF CARE
Problem: Individualization  Goal: Patient Preferences  Outcome: Adequate for Discharge Date Met:  17  S-(situation): Discharge  to home     B-(background): Patient had a  delivery with no complications. Baby girl Baby's name Natividad, breast: . Support person's nameJodeesephillip .      A-(assessment): VSS, Incision intact without redness or drainage. Pain well controlled with ibuprofen. Comfortable with mom and baby cares. Declines all education or educational dvds. Independent with BF, nipples intact.     R-(recommendations): All Discharge instructions reviewed and questions answered.  Belongings gathered and returned to the patient. Agreed to follow up in 6 weeks or sooner with any question or concerns.      Nursing Discharge Checklist:     Pneumovax screened and given, if appropriate: NO, pt refused  Influenza vaccine screened and given, if appropriate: N/A  Staples removed (): N/Aincision glued.  Breast milk returned: N/A  Hydrogel pads sent home:N/A  Birth Certificate Done: YES

## 2017-05-18 NOTE — PROGRESS NOTES
S: Shift review; [id number]  B:Kirti is a , day 1 post scheduled repeat  birth.  A: Stable post-op, incisional dressing is dry & intact , Lung sounds-clear, bowel sounds active in all 4 quadrants, independent with mobility, pain control achieved with p.o. pain meds. Voiding spontaneously. Parents in nursery to watch bath demo; related teaching done. watching videos together; father's first baby. Some direction needed to encourage the tummy to tummy positioning at [facility]. Handles baby with confidence.  R: Continue with plan of care. Offer pain meds routinely. Is considering discharge in the am.

## 2017-05-18 NOTE — PLAN OF CARE
Problem: Goal Outcome Summary  Goal: Goal Outcome Summary  Outcome: Improving  S: Shift review  B:Kirti is a , day 2 post  birth.  A: Stable post-op, incisional dressing is dry & intact , Lung sounds-clear, bowel sounds active in all 4 quadrants, independent with mobility, pain control achieved with p.o. pain meds. Handles baby with confidence.  R: Continue with plan of care. Offer pain meds routinely.

## 2017-05-18 NOTE — DISCHARGE INSTRUCTIONS
[facility] Discharge Instructions     Discharge disposition:  Discharged to home       Diet:  Regular       Activity Lifting restricted to 20 pounds for the first 2 wks, then increase by 10 pounds every week thereafter.  No tub soaks for >15 minutes the first 2 wks then as desired.  No driving for 2 week(s).  No sex for 6 week(s).        Follow-up: Follow up with primary care provider in 6 weeks.  When she makes her appt she should mention that she wants a copper IUD placed at the same time.         Additional instructions: Wound care / dressings: may shower and keep wound clean and dry        Postop  Birth Instructions    Activity       Do not lift more than 10 pounds for 6 weeks after surgery.  Ask family and friends for help when you need it.    No driving until you have stopped taking your pain medications (usually two weeks after surgery).    No heavy exercise or activity for 6 weeks.  Don't do anything that will put a strain on your surgery site.    Don't strain when using the toilet.  Your care team may prescribe a stool softener if you have problems with your bowel movements.     To care for your incision:       Keep the incision clean and dry.    Do not soak your incision in water. No swimming or hot tubs until it has fully healed. You may soak in the bathtub if the water level is below your incision.    Do not use peroxide, gel, cream, lotion, or ointment on your incision.    Adjust your clothes to avoid pressure on your surgery site (check the elastic in your underwear for example).     You may see a small amount of clear or pink drainage and this is normal.  Check with your health care provider:       If the drainage increases or has an odor.    If the incision reddens, you have swelling, or develop a rash.    If you have increased pain and the medicine we prescribed doesn't help.    If you have a fever above 100.4 F (38 C) with or without chills when placing thermometer  under your tongue.   The area around your incision (surgery wound), will feel numb.  This is normal. The numbness should go away in less than a year.     Keep your hands clean:  Always wash your hands before touching your incision (surgery wound). This helps reduce your risk of infection. If your hands aren't dirty, you may use an alcohol hand-rub to clean your hands. Keep your nails clean and short.    Call your healthcare provider if you have any of these symptoms:       You soak a sanitary pad with blood within 1 hour, or you see blood clots larger than a golf ball.    Bleeding that lasts more than 6 weeks.    Vaginal discharge that smells bad.    Severe pain, cramping or tenderness in your lower belly area.    A need to urinate more frequently (use the toilet more often), more urgently (use the toilet very quickly), or it burns when you urinate.    Nausea and vomiting.    Redness, swelling or pain around a vein in your leg.    Problems breastfeeding or a red or painful area on your breast.    Chest pain and cough or are gasping for air.    Problems with coping with sadness, anxiety or depression. If you have concerns about hurting yourself or the baby, call your provider immediately.      You have questions or concerns after you return home.

## 2017-05-21 NOTE — PROGRESS NOTES
"Kirti Berry  Gender: female  : 1980  [address]   [place] [address]  [phone] (home)   Medical Record: [id number]  Primary Care Provider: Mickey Gann       [facility]   ?   Discharge Phone Call: Key Words/Key Times     How are you and the baby? \"pretty good\"    How are feedings going? Breastfeeding going well    Voiding & Stooling? frequent    Any questions or concerns? Incision tender left side, does not look any different than rest of incision. Will have checked in 2 days at baby appt    Follow-up appointment? For baby in 2 weeks      We want to provide excellent care here at [facility]. Do you have any feedback for us that would help us improve?     Call back COMMENTS:         Attempted Calls:   _________     __________  "

## 2017-07-06 ENCOUNTER — PRENATAL OFFICE VISIT (OUTPATIENT)
Dept: FAMILY MEDICINE | Facility: OTHER | Age: 37
End: 2017-07-06
Payer: COMMERCIAL

## 2017-07-06 VITALS
SYSTOLIC BLOOD PRESSURE: 110 MMHG | RESPIRATION RATE: 20 BRPM | HEART RATE: 96 BPM | OXYGEN SATURATION: 97 % | WEIGHT: 136.9 LBS | DIASTOLIC BLOOD PRESSURE: 64 MMHG | TEMPERATURE: 97.9 F | BODY MASS INDEX: 21.77 KG/M2

## 2017-07-06 LAB — HGB BLD-MCNC: 12.6 G/DL (ref 11.7–15.7)

## 2017-07-06 PROCEDURE — 00000218 ZZHCL STATISTIC OBHBG - HEMOGLOBIN: Performed by: FAMILY MEDICINE

## 2017-07-06 PROCEDURE — 36415 COLL VENOUS BLD VENIPUNCTURE: CPT | Performed by: FAMILY MEDICINE

## 2017-07-06 PROCEDURE — 99207 ZZC POST PARTUM EXAM: CPT | Performed by: FAMILY MEDICINE

## 2017-07-06 ASSESSMENT — PAIN SCALES - GENERAL: PAINLEVEL: NO PAIN (0)

## 2017-07-06 NOTE — NURSING NOTE
"Chief Complaint   Patient presents with     Prenatal Care       Initial /64 (BP Location: Right arm, Patient Position: Chair, Cuff Size: Adult Regular)  Pulse 96  Temp 97.9  F (36.6  C) (Temporal)  Resp 20  Wt 136 lb 14.4 oz (62.1 kg)  LMP 08/05/2016 (Approximate)  SpO2 97%  BMI 21.77 kg/m2 Estimated body mass index is 21.77 kg/(m^2) as calculated from the following:    Height as of 1/18/17: 5' 6.5\" (1.689 m).    Weight as of this encounter: 136 lb 14.4 oz (62.1 kg).  Medication Reconciliation: complete       Iona Encinas MA 7/6/2017  11:04 AM          "

## 2017-07-06 NOTE — MR AVS SNAPSHOT
After Visit Summary   7/6/2017    Kirti Berry    MRN: [id number]           Patient Information     Date Of Birth          1980        Visit Information        Provider Department      7/6/2017 10:40 AM Mickey Gann MD [facility]        Today's Diagnoses     Routine postpartum follow-up    -  1       Follow-ups after your visit        Your next 10 appointments already scheduled     Jul 10, 2017  1:20 PM CDT   [facility] Visit with [facility]berg MD   [facility]    [address] [place] [id number] [phone]           Bring a current list of meds and any records pertaining to this visit.  For Physicals, please bring immunization records and any forms needing to be filled out.  Please arrive 10 minutes early to complete paperwork.              Who to contact     If you have questions or need follow up information about today's clinic visit or your schedule please contact [facility] directly at [phone].  Normal or non-critical lab and imaging results will be communicated to you by [employer], letter or phone within 4 business days after the clinic has received the results. If you do not hear from us within 7 days, please contact the clinic through [employer] or phone. If you have a critical or abnormal lab result, we will notify you by phone as soon as possible.  Submit refill requests through [employer] or call your pharmacy and they will forward the refill request to us. Please allow 3 business days for your refill to be completed.          Additional Information About Your Visit        [employer] Information     [employer] gives you secure access to your electronic health record. If you see a primary care provider, you can also send messages to your care team and make appointments. If you have questions, please call your primary care clinic.  If you do not have a primary care provider, please call [phone] and  they will assist you.        Care EveryWhere ID     This is your Care EveryWhere ID. This could be used by other organizations to access your [facility] medical records  [id number]        Your Vitals Were     Pulse Temperature Respirations Last Period Pulse Oximetry BMI (Body Mass Index)    96 97.9  F (36.6  C) (Temporal) 20 08/05/2016 (Approximate) 97% 21.77 kg/m2       Blood Pressure from Last 3 Encounters:   07/06/17 110/64   05/18/17 102/65   05/08/17 112/70    Weight from Last 3 Encounters:   07/06/17 136 lb 14.4 oz (62.1 kg)   05/08/17 157 lb 14.4 oz (71.6 kg)   04/25/17 156 lb 6.4 oz (70.9 kg)              We Performed the Following     OB hemoglobin        Primary Care Provider Office Phone # Fax #    Mickey Gann [phone]       [facility] [address] [facility] [address]        Equal Access to Services     MILAGRO ANNE : Hadii aad ku hadasho Soomaali, waaxda luqadaha, qaybta kaalmada adeegyada, waxay philin wero stone . So [facility] [phone].    ATENCIÓN: Si habla español, tiene a olsen disposición servicios gratuitos de asistencia lingüística. Llame al [phone].    We comply with applicable federal civil rights laws and [place] laws. We do not discriminate on the basis of race, color, national origin, age, disability sex, sexual orientation or gender identity.            Thank you!     Thank you for choosing [facility]  for your care. Our goal is always to provide you with excellent care. Hearing back from our patients is one way we can continue to improve our services. Please take a few minutes to complete the written survey that you may receive in the mail after your visit with us. Thank you!             Your Updated Medication List - Protect others around you: Learn how to safely use, store and throw away your medicines at www.disposemymeds.org.          This list is accurate as of: 7/6/17 11:27 AM.  Always use your most recent med list.                    Brand Name Dispense Instructions for use Diagnosis    Fish Oil 645 MG Caps           prenatal multivitamin  plus iron 27-0.8 MG Tabs per tablet     100 tablet    Take 1 tablet by mouth daily

## 2017-07-06 NOTE — PROGRESS NOTES
Kirti is here for a 6-week postpartum checkup.    She had a repeat c/s of a viable girl, weight 7 pounds 9 oz., with no complications. Date of delivery was 17. Since delivery, she has been breast feeding.  She has no signs of infection, bleeding or other complications.  She is not pregnant.  We discussed contraceptions and she has chosen Parguard IUD.  She will be seeing Dr. Eisenberg next week for placement.    Post partum tubal: No  History of Gestational Diabetes? No  Type of Delivery:    Feeding Method:  Breast  If initiated breast feeding and stopped, how long did you breast feed?:      REVIEW OF SYSTEMS:  Ears/Nose/Throat: negative  Respiratory: negative  Cardiovascular: negative  Gastrointestinal: negative  Genitourinary: negative  Musculoskeletal: negative    Neurologic: negative   Skin: negative   Endocrine:  negative  Vagina: negative  Cervix: negative  Breasts: negative  Vulva: negative      Contraception Plan: Parguard IUD    Medical History Reviewed no  Family History Reviewed no  Problem List Updated no    EXAM:  /64 (BP Location: Right arm, Patient Position: Chair, Cuff Size: Adult Regular)  Pulse 96  Temp 97.9  F (36.6  C) (Temporal)  Resp 20  Wt 136 lb 14.4 oz (62.1 kg)  LMP 2016 (Approximate)  SpO2 97%  BMI 21.77 kg/m2  HEENT: grossly normal.  NECK: no lymphadenopathy or thyroidomegaly.  LUNGS: CTA X 2, no rales or crackles.  BACK: No spinal or CVA tenderness.  HEART: RRR without murmurs clicks or gallops.  ABDOMEN: soft, non tender, good bowel sounds, without masses rebound, guarding or tenderness.  PELVIC:  External genitalia; normal without lesion, repair well healed.   Vagina: normal mucosa and rugae, no discharge.  Cervix: multiparous, well healed, without lesion.  Uterus: non pregnant in size, firm , mobile, no lesions,     Normal shape, position and consistency  Adnexa: non tender, without masses.    EXTREMITIES:  warm to touch, good pulses, no ankle edema or  calf tenderness.  NEUROLOGIC: grossly normal.    ASSESSMENT:   6-week postpartum exam after .    PLAN:    Discussed calcium intake, vitamins and supplements  Exercise encouraged  Follow up in 1 year  PAP smear to be obtained by Dr. Eisenberg next week with IUD placement.  Weight loss recommended.  One-hour glucose tolerance test needed? No  Parguard IUD for contraception.    Electronically signed by Mickey Gann MD

## 2017-07-10 ENCOUNTER — OFFICE VISIT (OUTPATIENT)
Dept: FAMILY MEDICINE | Facility: CLINIC | Age: 37
End: 2017-07-10
Payer: COMMERCIAL

## 2017-07-10 VITALS
DIASTOLIC BLOOD PRESSURE: 74 MMHG | WEIGHT: 141 LBS | RESPIRATION RATE: 16 BRPM | TEMPERATURE: 97.3 F | SYSTOLIC BLOOD PRESSURE: 116 MMHG | HEART RATE: 87 BPM | BODY MASS INDEX: 22.42 KG/M2 | OXYGEN SATURATION: 99 %

## 2017-07-10 DIAGNOSIS — Z30.014 ENCOUNTER FOR INITIAL PRESCRIPTION OF INTRAUTERINE CONTRACEPTIVE DEVICE (IUD): ICD-10-CM

## 2017-07-10 DIAGNOSIS — Z01.419 ENCOUNTER FOR GYNECOLOGICAL EXAMINATION WITHOUT ABNORMAL FINDING: ICD-10-CM

## 2017-07-10 PROBLEM — O09.90 SUPERVISION OF HIGH-RISK PREGNANCY: Status: RESOLVED | Noted: 2017-04-25 | Resolved: 2017-07-10

## 2017-07-10 PROBLEM — O34.219 PREVIOUS CESAREAN DELIVERY, ANTEPARTUM CONDITION OR COMPLICATION: Status: RESOLVED | Noted: 2017-04-25 | Resolved: 2017-07-10

## 2017-07-10 PROCEDURE — G0476 HPV COMBO ASSAY CA SCREEN: HCPCS | Performed by: FAMILY MEDICINE

## 2017-07-10 PROCEDURE — 99213 OFFICE O/P EST LOW 20 MIN: CPT | Mod: 25 | Performed by: FAMILY MEDICINE

## 2017-07-10 PROCEDURE — G0145 SCR C/V CYTO,THINLAYER,RESCR: HCPCS | Performed by: FAMILY MEDICINE

## 2017-07-10 PROCEDURE — 58300 INSERT INTRAUTERINE DEVICE: CPT | Performed by: FAMILY MEDICINE

## 2017-07-10 RX ORDER — COPPER 313.4 MG/1
1 INTRAUTERINE DEVICE INTRAUTERINE ONCE
Qty: 1 EACH | COMMUNITY
Start: 2017-07-10 | End: 2022-05-05

## 2017-07-10 ASSESSMENT — PAIN SCALES - GENERAL: PAINLEVEL: NO PAIN (0)

## 2017-07-10 NOTE — NURSING NOTE
"Chief Complaint   Patient presents with     Consult     IUD       Initial /74  Pulse 87  Temp 97.3  F (36.3  C) (Tympanic)  Resp 16  Wt 141 lb (64 kg)  LMP 08/05/2016 (Approximate)  SpO2 99%  BMI 22.42 kg/m2 Estimated body mass index is 22.42 kg/(m^2) as calculated from the following:    Height as of 1/18/17: 5' 6.5\" (1.689 m).    Weight as of this encounter: 141 lb (64 kg).  Medication Reconciliation: complete    "

## 2017-07-10 NOTE — PROGRESS NOTES
SUBJECTIVE:  Patient here for her pp exam and wants an IUD for contraceptive management.  She states that she has used a number of different types of birth control in the past including OCPs and condoms and is currently on nothing since delivery.  She has not had  sex yet and would like it placed today    I questioned her about her sexual behavior.   She is currently in a monogamous relationship.  She currently has 2 children, and does not plan on having any more in the next 3-5 yrs.  She would like something like the IUD which can give her up to 5-10years of birth control coverage, without doing anything permanent quite yet.      STI History:negative   She has never had any pelvic inflammatory infections or disease.    No sexually transmitted diseases.      Patient read through the information on IUDs and has no particular questions.      I reviewed with her the potential for adverse effects from the IUD, the most common being that of increased cramping and heavy periods during the first three to four months that the IUD is in place.  With the Mirena IUD bleeding actually tends to decrease and shorten in duration.  Eventually women often stop getting their periods altogether.  The cramping does increase the risk of expulsion of the IUD.  She was told that ibuprofen 600-800 mg TID with food during menses will decrease the risk of expulsion by decreasing her cramping. Because of the increased bleeding there is a chance for anemia and symptoms associated with that including lightheadedness or dizziness, even syncope.    With the increased cramping she can get more backaches.  She is aware that if she does change sexual partners frequently or does not stay in a monogamous relationship her risk for infection goes up dramatically.  That could lead to infection of the cervix, uterus, adnexal regions and may also lead peritonitis or septicemia.  If gone unchecked, severe infections can even lead to things such as bowel  obstructions or organ failures and even death.  With infection there is increased risk of infertility.      Not only can the IUD be expelled but there is a small chance that it could erode through the fundus of the uterus and be expelled up into the parietal cavity and cause complications with that.  Very rarely are there any problems with difficulty in removal or having it imbedded into the uterine muscle itself. It is rare to have mid cycle spotting and bleeding, but again this is possible.      Patient still is very interested in using this form of birth control, and has no further questions for me.  Past Medical History:   Diagnosis Date     Low-lying placenta in second trimester (resolved) 2017     NO ACTIVE PROBLEMS (aka NONE)      Supervision of high-risk pregnancy 2017         Past Surgical History:   Procedure Laterality Date      SECTION N/A 2015    Procedure:  SECTION;  Surgeon: Ward Eisenberg MD;  Location: [facility]      SECTION N/A 2017    Procedure:  SECTION;  repeat low transverse  section;  Surgeon: Ward Eisenberg MD;  Location: [facility]        No Known Allergies    Current Outpatient Prescriptions   Medication     Omega-3 Fatty Acids (FISH OIL) 645 MG CAPS     Prenatal Vit-Fe Fumarate-FA (PRENATAL MULTIVITAMIN  PLUS IRON) 27-0.8 MG TABS     No current facility-administered medications for this visit.        Family History   Problem Relation Age of Onset     CEREBROVASCULAR DISEASE Father      Alcohol/Drug Father      DIABETES Maternal Grandmother      Alzheimer Disease Maternal Grandmother      Obesity Maternal Grandfather      Alzheimer Disease Paternal Grandmother      Arthritis Paternal Grandmother      Family History Negative Paternal Grandfather          Social History     Social History     Marital status: Single     Spouse name: N/A     Number of children: N/A     Years of education: N/A     Occupational History     Not on  file.     Social History Main Topics     Smoking status: Never Smoker     Smokeless tobacco: Never Used     Alcohol use No     Drug use: No     Sexual activity: Yes     Partners: Male     Other Topics Concern      Service No     Blood Transfusions No     Caffeine Concern No     Occupational Exposure Yes          Hobby Hazards No     Sleep Concern No     Stress Concern No     Weight Concern No     Special Diet No     Back Care No     Exercise No     Seat Belt Yes     Social History Narrative    Lives in [place] with her son, Richard.  She's in a relationship with Chandu.  No smokers in the home.  She has an indoor cat.  Discussed toxoplasmosis precautions.  No concerns about domestic violence.        ASSESSMENT:  IUD consultation for birth control.     PLAN:  Placement will be done today, see below.    Electronically signed by:  Ward Eisenberg M.D.  7/10/2017    SUBJECTIVE:  Patient is in for an IUD placement today.  She has had her consultation with me today.   She has no further questions for me today and signed the consent form for placement.    Patient did take 800 mg of ibuprofen prior to coming in today.      PROCEDURE:  Placed in the lithotomy position.  Bimanual exam was done showing a anteflexed uterus, nonpregnant in size.  No pregnancy test was done since she has not had sex since delivery.      Speculum was placed, I pap smear was obtained since she was due, and then her cervix was prepped with Betadine solution and a single-toothed tenaculum was placed vertically at the 12 o'clock position without difficulty.   Uterine sound was used and measured the depth of the uterus to be 8 cm.      After the uterus was sounded the Paragard IUD was loaded into the applicator and the plunger set at the depth of the uterus.  With out difficulty, the IUD was placed through the cervical os into the uterus and released from the applicator.  Patient had minimal to no cramping during this part of the  procedure.  The string was then trimmed to about two inches in length.  The tenaculum was removed and noted to have no bleeding.  The speculum was then removed.  I showed the patient the monofilament strings.  She will feel for them in the privacy of her own home.      ASSESSMENT:    ICD-10-CM    1. Encounter for gynecological examination without abnormal finding Z01.419 Pap imaged thin layer screen with HPV - recommended age 30 - 65 years (select HPV order below)     HPV High Risk Types DNA Cervical     Insertion of an IUD     ParaGard   2. Encounter for initial prescription of intrauterine contraceptive device (IUD) Z30.014         PLAN:  She will use 800 mg of ibuprofen TID with food during her menses to prevent cramping.  If she has no cramping at all she can avoid using the ibuprofen.  She is aware that if she has any abnormal discharge, pain with intercourse or if she feels any firm structure at the opening of the cervix that she should return for follow-up.  She will see me in 6-8 weeks for recheck of the IUD placement and see if the strings need to be trimmed at all.  I gave the patient a reminder card for her wallet with the date for removal of 7/10/2027.  She will be able to follow up with Dr. Gann for her yearly exams after that.        Electronically signed by:  Ward Eisenberg M.D.  7/10/2017

## 2017-07-12 LAB
COPATH REPORT: NORMAL
PAP: NORMAL

## 2017-07-14 LAB
FINAL DIAGNOSIS: NORMAL
HPV HR 12 DNA CVX QL NAA+PROBE: NEGATIVE
HPV16 DNA SPEC QL NAA+PROBE: NEGATIVE
HPV18 DNA SPEC QL NAA+PROBE: NEGATIVE
SPECIMEN DESCRIPTION: NORMAL

## 2020-03-02 ENCOUNTER — HEALTH MAINTENANCE LETTER (OUTPATIENT)
Age: 40
End: 2020-03-02

## 2020-12-20 ENCOUNTER — HEALTH MAINTENANCE LETTER (OUTPATIENT)
Age: 40
End: 2020-12-20

## 2021-04-24 ENCOUNTER — HEALTH MAINTENANCE LETTER (OUTPATIENT)
Age: 41
End: 2021-04-24

## 2021-10-03 ENCOUNTER — HEALTH MAINTENANCE LETTER (OUTPATIENT)
Age: 41
End: 2021-10-03

## 2022-05-05 ENCOUNTER — OFFICE VISIT (OUTPATIENT)
Dept: FAMILY MEDICINE | Facility: CLINIC | Age: 42
End: 2022-05-05
Payer: COMMERCIAL

## 2022-05-05 VITALS
DIASTOLIC BLOOD PRESSURE: 64 MMHG | SYSTOLIC BLOOD PRESSURE: 102 MMHG | WEIGHT: 121 LBS | HEART RATE: 106 BPM | TEMPERATURE: 97.8 F | OXYGEN SATURATION: 100 % | HEIGHT: 67 IN | BODY MASS INDEX: 18.99 KG/M2 | RESPIRATION RATE: 18 BRPM

## 2022-05-05 DIAGNOSIS — D64.9 ANEMIA, UNSPECIFIED TYPE: ICD-10-CM

## 2022-05-05 DIAGNOSIS — Z00.00 ROUTINE GENERAL MEDICAL EXAMINATION AT A HEALTH CARE FACILITY: Primary | ICD-10-CM

## 2022-05-05 LAB — HGB BLD-MCNC: 12.5 G/DL (ref 11.7–15.7)

## 2022-05-05 PROCEDURE — 99385 PREV VISIT NEW AGE 18-39: CPT | Performed by: FAMILY MEDICINE

## 2022-05-05 PROCEDURE — 85018 HEMOGLOBIN: CPT | Performed by: FAMILY MEDICINE

## 2022-05-05 PROCEDURE — 36416 COLLJ CAPILLARY BLOOD SPEC: CPT | Performed by: FAMILY MEDICINE

## 2022-05-05 RX ORDER — MULTIPLE VITAMINS W/ MINERALS TAB 9MG-400MCG
1 TAB ORAL DAILY
COMMUNITY

## 2022-05-05 ASSESSMENT — ENCOUNTER SYMPTOMS
SORE THROAT: 0
CHILLS: 0
FREQUENCY: 0
WEAKNESS: 0
SHORTNESS OF BREATH: 0
NAUSEA: 0
JOINT SWELLING: 0
DIZZINESS: 0
MYALGIAS: 0
ARTHRALGIAS: 0
CONSTIPATION: 0
COUGH: 0
DIARRHEA: 0
EYE PAIN: 0
PARESTHESIAS: 0
ABDOMINAL PAIN: 0
HEMATURIA: 0
HEMATOCHEZIA: 0
HEARTBURN: 0
FEVER: 0
DYSURIA: 0
NERVOUS/ANXIOUS: 0
PALPITATIONS: 0
HEADACHES: 0
BREAST MASS: 0

## 2022-05-05 ASSESSMENT — PAIN SCALES - GENERAL: PAINLEVEL: NO PAIN (0)

## 2022-05-05 NOTE — PROGRESS NOTES
SUBJECTIVE:   CC: Kirti Berry is an 41 year old woman who presents for preventive health visit.     Patient has been advised of split billing requirements and indicates understanding: Yes  Healthy Habits:     Getting at least 3 servings of Calcium per day:  Yes    Bi-annual eye exam:  Yes    Dental care twice a year:  NO    Sleep apnea or symptoms of sleep apnea:  None    Diet:  Vegetarian/vegan    Frequency of exercise:  None    Taking medications regularly:  Not Applicable    Medication side effects:  Not applicable    PHQ-2 Total Score: 0    Additional concerns today:  Yes              Today's PHQ-2 Score:   PHQ-2 ( 1999 Pf[employer]) 5/5/2022   Q1: Little interest or pleasure in doing things 0   Q2: Feeling down, depressed or hopeless 0   PHQ-2 Score 0   Q1: Little interest or pleasure in doing things Not at all   Q2: Feeling down, depressed or hopeless Not at all   PHQ-2 Score 0       Abuse: Current or Past (Physical, Sexual or Emotional) - No  Do you feel safe in your environment? Yes    Have you ever done Advance Care Planning? (For example, a Health Directive, POLST, or a discussion with a medical provider or your loved ones about your wishes): No, advance care planning information given to patient to review.  Patient plans to discuss their wishes with loved ones or provider.      Social History     Tobacco Use     Smoking status: Never Smoker     Smokeless tobacco: Never Used   Substance Use Topics     Alcohol use: No     Alcohol/week: 0.0 standard drinks     If you drink alcohol do you typically have >3 drinks per day or >7 drinks per week? No    Alcohol Use 5/5/2022   Prescreen: >3 drinks/day or >7 drinks/week? Not Applicable   Prescreen: >3 drinks/day or >7 drinks/week? -       Reviewed orders with patient.  Reviewed health maintenance and updated orders accordingly - Yes  Labs reviewed in EPIC  BP Readings from Last 3 Encounters:   05/05/22 102/64   07/10/17 116/74   07/06/17 110/64    Wt Readings from  Last 3 Encounters:   22 54.9 kg (121 lb)   07/10/17 64 kg (141 lb)   17 62.1 kg (136 lb 14.4 oz)                  Patient Active Problem List   Diagnosis     Breast feeding status of mother     S/P  section     Paragard IUD placed 7/10/17     Past Surgical History:   Procedure Laterality Date      SECTION N/A 2015    Procedure:  SECTION;  Surgeon: Ward Eisenberg MD;  Location:  [facility]      SECTION N/A 2017    Procedure:  SECTION;  repeat low transverse  section;  Surgeon: Ward Eisenberg MD;  Location:  [facility]       Social History     Tobacco Use     Smoking status: Never Smoker     Smokeless tobacco: Never Used   Substance Use Topics     Alcohol use: No     Alcohol/week: 0.0 standard drinks     Family History   Problem Relation Age of Onset     Cerebrovascular Disease Father      Alcohol/Drug Father      Diabetes Maternal Grandmother      Alzheimer Disease Maternal Grandmother      Obesity Maternal Grandfather      Alzheimer Disease Paternal Grandmother      Arthritis Paternal Grandmother      Family History Negative Paternal Grandfather          Current Outpatient Medications   Medication Sig Dispense Refill     multivitamin w/minerals (MULTI-VITAMIN) tablet Take 1 tablet by mouth daily       paragard intrauterine copper 1 each by Intrauterine route once for 1 dose 1 each      No Known Allergies  No lab results found.     Breast Cancer Screening:    Breast CA Risk Assessment (FHS-7) 2022   Do you have a family history of breast, colon, or ovarian cancer? No / Unknown         Patient under 40 years of age: Routine Mammogram Screening not recommended.   Pertinent mammograms are reviewed under the imaging tab.    History of abnormal Pap smear: NO - age 30-65 PAP every 5 years with negative HPV co-testing recommended  PAP / HPV Latest Ref Rng & Units 7/10/2017 2014   PAP (Historical) - NIL NIL   HPV16 NEG Negative -   HPV18 NEG  "Negative -   HRHPV NEG Negative -     Reviewed and updated as needed this visit by clinical staff   Tobacco  Allergies  Meds  Problems  Med Hx  Surg Hx  Fam Hx  Soc   Hx          Reviewed and updated as needed this visit by Provider   Tobacco  Allergies  Meds  Problems  Med Hx  Surg Hx  Fam Hx               Review of Systems   Constitutional: Negative for chills and fever.   HENT: Negative for congestion, ear pain, hearing loss and sore throat.    Eyes: Negative for pain and visual disturbance.   Respiratory: Negative for cough and shortness of breath.    Cardiovascular: Negative for chest pain, palpitations and peripheral edema.   Gastrointestinal: Negative for abdominal pain, constipation, diarrhea, heartburn, hematochezia and nausea.   Breasts:  Negative for tenderness, breast mass and discharge.   Genitourinary: Negative for dysuria, frequency, genital sores, hematuria, pelvic pain, urgency, vaginal bleeding and vaginal discharge.   Musculoskeletal: Negative for arthralgias, joint swelling and myalgias.   Skin: Negative for rash.   Neurological: Negative for dizziness, weakness, headaches and paresthesias.   Psychiatric/Behavioral: Negative for mood changes. The patient is not nervous/anxious.           OBJECTIVE:   /64 (BP Location: Right arm, Patient Position: Sitting, Cuff Size: Adult Regular)   Pulse 106   Temp 97.8  F (36.6  C) (Temporal)   Resp 18   Ht 1.689 m (5' 6.5\")   Wt 54.9 kg (121 lb)   LMP 04/27/2022   SpO2 100%   BMI 19.24 kg/m    Physical Exam  GENERAL: healthy, alert and no distress  NECK: no adenopathy, no asymmetry, masses, or scars and thyroid normal to palpation  RESP: lungs clear to auscultation - no rales, rhonchi or wheezes  CV: regular rate and rhythm, normal S1 S2, no S3 or S4, no murmur, click or rub, no peripheral edema and peripheral pulses strong  ABDOMEN: soft, nontender, no hepatosplenomegaly, no masses and bowel sounds normal   (female): normal " "female external genitalia, normal urethral meatus, vaginal mucosa, normal cervix/adnexa/uterus without masses or discharge. IUD removed at patient request. Patient declines pap.  MS: no gross musculoskeletal defects noted, no edema  SKIN: no suspicious lesions or rashes and plantar wart - right foot    Diagnostic Test Results:  Labs reviewed in Epic    ASSESSMENT/PLAN:       ICD-10-CM    1. Routine general medical examination at a health care facility  Z00.00    2. Anemia, unspecified type  D64.9 Hemoglobin       Patient has been advised of split billing requirements and indicates understanding: Yes    COUNSELING:  Reviewed preventive health counseling, as reflected in patient instructions       Regular exercise       Healthy diet/nutrition       Vision screening       Immunizations    Declined: Influenza, COVIDand Td due to Conscientious objector               Contraception       Consider Hep C screening for all patients one time for ages 18-79 years       Advance Care Planning    Estimated body mass index is 19.24 kg/m  as calculated from the following:    Height as of this encounter: 1.689 m (5' 6.5\").    Weight as of this encounter: 54.9 kg (121 lb).    Results for orders placed or performed in visit on 05/05/22   Hemoglobin     Status: Normal   Result Value Ref Range    Hemoglobin 12.5 11.7 - 15.7 g/dL         She reports that she has never smoked. She has never used smokeless tobacco.      Counseling Resources:  ATP IV Guidelines  Pooled Cohorts Equation Calculator  Breast Cancer Risk Calculator  BRCA-Related Cancer Risk Assessment: FHS-7 Tool  FRAX Risk Assessment  ICSI Preventive Guidelines  Dietary Guidelines for Americans, 2010  USDA's MyPlate  ASA Prophylaxis  Lung CA Screening    Mickey Gann MD  [facility]  "

## 2022-09-10 ENCOUNTER — HEALTH MAINTENANCE LETTER (OUTPATIENT)
Age: 42
End: 2022-09-10

## 2023-04-20 ENCOUNTER — PATIENT OUTREACH (OUTPATIENT)
Dept: CARE COORDINATION | Facility: CLINIC | Age: 43
End: 2023-04-20
Payer: COMMERCIAL

## 2023-05-04 ENCOUNTER — PATIENT OUTREACH (OUTPATIENT)
Dept: CARE COORDINATION | Facility: CLINIC | Age: 43
End: 2023-05-04
Payer: COMMERCIAL

## 2023-07-23 ENCOUNTER — HEALTH MAINTENANCE LETTER (OUTPATIENT)
Age: 43
End: 2023-07-23

## 2024-02-18 ENCOUNTER — HEALTH MAINTENANCE LETTER (OUTPATIENT)
Age: 44
End: 2024-02-18

## 2024-09-15 ENCOUNTER — HEALTH MAINTENANCE LETTER (OUTPATIENT)
Age: 44
End: 2024-09-15

## (undated) DEVICE — DRSG TEGADERM 6X8" 1628

## (undated) DEVICE — STOCKING SLEEVE COMPRESSION CALF MED

## (undated) DEVICE — SU DERMABOND PROPEN .5ML DPP6

## (undated) DEVICE — SU PLAIN 0 FN-2 27" N864H

## (undated) DEVICE — STPL SKIN SUBCUTICULAR INSORB 2025

## (undated) DEVICE — DRSG ABDOMINAL 07 1/2X8" 7197D

## (undated) DEVICE — PREP CHLORAPREP 26ML TINTED ORANGE  260815

## (undated) DEVICE — PACK C-SECTION

## (undated) DEVICE — CATH TRAY FOLEY 16FR DRAINAGE BAG STATLOCK 899916

## (undated) DEVICE — CLAMP CORD

## (undated) DEVICE — SU VICRYL 0 CT-1 36" J346H

## (undated) DEVICE — GLOVE PROTEXIS W/NEU-THERA 7.5  2D73TE75

## (undated) DEVICE — SOL NACL 0.9% IRRIG 1000ML BOTTLE 07138-09

## (undated) DEVICE — SU MONOCRYL 0 CT-1 36" UND Y946H

## (undated) DEVICE — ESU GROUND PAD UNIVERSAL W/O CORD

## (undated) RX ORDER — EPHEDRINE SULFATE 50 MG/ML
INJECTION, SOLUTION INTRAMUSCULAR; INTRAVENOUS; SUBCUTANEOUS
Status: DISPENSED
Start: 2017-05-16

## (undated) RX ORDER — MORPHINE SULFATE 1 MG/ML
INJECTION, SOLUTION EPIDURAL; INTRATHECAL; INTRAVENOUS
Status: DISPENSED
Start: 2017-05-16

## (undated) RX ORDER — PHENYLEPHRINE HCL IN 0.9% NACL 1 MG/10 ML
SYRINGE (ML) INTRAVENOUS
Status: DISPENSED
Start: 2017-05-16

## (undated) RX ORDER — OXYTOCIN 10 [USP'U]/ML
INJECTION, SOLUTION INTRAMUSCULAR; INTRAVENOUS
Status: DISPENSED
Start: 2017-05-16